# Patient Record
Sex: FEMALE | Race: BLACK OR AFRICAN AMERICAN | Employment: OTHER | URBAN - METROPOLITAN AREA
[De-identification: names, ages, dates, MRNs, and addresses within clinical notes are randomized per-mention and may not be internally consistent; named-entity substitution may affect disease eponyms.]

---

## 2017-02-17 ENCOUNTER — HOSPITAL ENCOUNTER (EMERGENCY)
Age: 77
Discharge: HOME | End: 2017-02-17
Attending: EMERGENCY MEDICINE
Payer: MEDICARE

## 2017-02-17 ENCOUNTER — APPOINTMENT (OUTPATIENT)
Dept: GENERAL RADIOLOGY | Age: 77
End: 2017-02-17
Attending: EMERGENCY MEDICINE
Payer: MEDICARE

## 2017-02-17 VITALS
RESPIRATION RATE: 16 BRPM | SYSTOLIC BLOOD PRESSURE: 161 MMHG | OXYGEN SATURATION: 96 % | WEIGHT: 174 LBS | BODY MASS INDEX: 34.16 KG/M2 | HEART RATE: 71 BPM | TEMPERATURE: 98 F | HEIGHT: 60 IN | DIASTOLIC BLOOD PRESSURE: 75 MMHG

## 2017-02-17 DIAGNOSIS — I47.1 PAROXYSMAL SUPRAVENTRICULAR TACHYCARDIA (HCC): Primary | ICD-10-CM

## 2017-02-17 LAB
ALBUMIN SERPL BCP-MCNC: 3.1 G/DL (ref 3.2–4.6)
ALBUMIN/GLOB SERPL: 0.8 {RATIO} (ref 1.2–3.5)
ALP SERPL-CCNC: 89 U/L (ref 50–136)
ALT SERPL-CCNC: 30 U/L (ref 12–65)
ANION GAP BLD CALC-SCNC: 9 MMOL/L (ref 7–16)
AST SERPL W P-5'-P-CCNC: 47 U/L (ref 15–37)
ATRIAL RATE: 76 BPM
BASOPHILS # BLD AUTO: 0 K/UL (ref 0–0.2)
BASOPHILS # BLD: 0 % (ref 0–2)
BILIRUB SERPL-MCNC: 1.1 MG/DL (ref 0.2–1.1)
BUN SERPL-MCNC: 10 MG/DL (ref 8–23)
CALCIUM SERPL-MCNC: 8.6 MG/DL (ref 8.3–10.4)
CALCULATED P AXIS, ECG09: 63 DEGREES
CALCULATED R AXIS, ECG10: -17 DEGREES
CALCULATED T AXIS, ECG11: 10 DEGREES
CHLORIDE SERPL-SCNC: 104 MMOL/L (ref 98–107)
CO2 SERPL-SCNC: 27 MMOL/L (ref 21–32)
CREAT SERPL-MCNC: 1.1 MG/DL (ref 0.6–1)
DIAGNOSIS, 93000: NORMAL
DIASTOLIC BP, ECG02: NORMAL MMHG
DIFFERENTIAL METHOD BLD: ABNORMAL
EOSINOPHIL # BLD: 0.1 K/UL (ref 0–0.8)
EOSINOPHIL NFR BLD: 3 % (ref 0.5–7.8)
ERYTHROCYTE [DISTWIDTH] IN BLOOD BY AUTOMATED COUNT: 14.1 % (ref 11.9–14.6)
GLOBULIN SER CALC-MCNC: 3.9 G/DL (ref 2.3–3.5)
GLUCOSE SERPL-MCNC: 264 MG/DL (ref 65–100)
HCT VFR BLD AUTO: 38.6 % (ref 35.8–46.3)
HGB BLD-MCNC: 11.8 G/DL (ref 11.7–15.4)
IMM GRANULOCYTES # BLD: 0 K/UL (ref 0–0.5)
IMM GRANULOCYTES NFR BLD AUTO: 0.2 % (ref 0–5)
LYMPHOCYTES # BLD AUTO: 35 % (ref 13–44)
LYMPHOCYTES # BLD: 1.6 K/UL (ref 0.5–4.6)
MAGNESIUM SERPL-MCNC: 1.9 MG/DL (ref 1.8–2.4)
MCH RBC QN AUTO: 23.2 PG (ref 26.1–32.9)
MCHC RBC AUTO-ENTMCNC: 30.6 G/DL (ref 31.4–35)
MCV RBC AUTO: 76 FL (ref 79.6–97.8)
MONOCYTES # BLD: 0.2 K/UL (ref 0.1–1.3)
MONOCYTES NFR BLD AUTO: 5 % (ref 4–12)
NEUTS SEG # BLD: 2.5 K/UL (ref 1.7–8.2)
NEUTS SEG NFR BLD AUTO: 57 % (ref 43–78)
P-R INTERVAL, ECG05: 162 MS
PLATELET # BLD AUTO: 190 K/UL (ref 150–450)
PMV BLD AUTO: 11.1 FL (ref 10.8–14.1)
POTASSIUM SERPL-SCNC: 4.8 MMOL/L (ref 3.5–5.1)
PROT SERPL-MCNC: 7 G/DL (ref 6.3–8.2)
Q-T INTERVAL, ECG07: 422 MS
QRS DURATION, ECG06: 114 MS
QTC CALCULATION (BEZET), ECG08: 474 MS
RBC # BLD AUTO: 5.08 M/UL (ref 4.05–5.25)
SODIUM SERPL-SCNC: 140 MMOL/L (ref 136–145)
SYSTOLIC BP, ECG01: NORMAL MMHG
TROPONIN I SERPL-MCNC: <0.02 NG/ML (ref 0.02–0.05)
VENTRICULAR RATE, ECG03: 76 BPM
WBC # BLD AUTO: 4.5 K/UL (ref 4.3–11.1)

## 2017-02-17 PROCEDURE — 71020 XR CHEST PA LAT: CPT

## 2017-02-17 PROCEDURE — 80053 COMPREHEN METABOLIC PANEL: CPT | Performed by: EMERGENCY MEDICINE

## 2017-02-17 PROCEDURE — 85025 COMPLETE CBC W/AUTO DIFF WBC: CPT

## 2017-02-17 PROCEDURE — 93005 ELECTROCARDIOGRAM TRACING: CPT | Performed by: EMERGENCY MEDICINE

## 2017-02-17 PROCEDURE — 83735 ASSAY OF MAGNESIUM: CPT | Performed by: EMERGENCY MEDICINE

## 2017-02-17 PROCEDURE — 99284 EMERGENCY DEPT VISIT MOD MDM: CPT | Performed by: EMERGENCY MEDICINE

## 2017-02-17 PROCEDURE — 84484 ASSAY OF TROPONIN QUANT: CPT | Performed by: EMERGENCY MEDICINE

## 2017-02-17 NOTE — ED NOTES
I have reviewed discharge instructions with the patient. Patient verbalizes understanding. Opportunity for questions provided. Patient ambulatory off the unit. No distress noted at this time.

## 2017-02-17 NOTE — ED TRIAGE NOTES
Patient complains of palpitations that began around 1015 today. Denies any chest pain. Reports some SOB. States she feels okay now.

## 2017-02-17 NOTE — ED PROVIDER NOTES
HPI Comments: Patient presents to the ER after having an episode today of acute onset of some palpitations and feeling as if her heart was skipping a beat. Patient states it lasted for approximately 10 minutes. She reports some associated lightheadedness but denies any chest pain, nausea or vomiting. She denies any fevers or chills. Patient is a 67 y.o. female presenting with palpitations. The history is provided by the patient. Palpitations    This is a new problem. The current episode started 1 to 2 hours ago. The problem has been resolved. On average, each episode lasts 10 minutes. The problem is associated with nothing. Associated symptoms include irregular heartbeat. Pertinent negatives include no diaphoresis, no fever, no numbness, no chest pain, no exertional chest pressure, no PND, no abdominal pain, no headaches, no back pain, no lower extremity edema, no cough and no shortness of breath. Risk factors include cardiac disease. Her past medical history is significant for heart disease and past MI. Past Medical History:   Diagnosis Date    Arthritis      osteoarthritis - L leg     CAD (coronary artery disease)      stent placement 2008- 2000 MI     Carotid artery stenosis without cerebral infarction 7/7/2016     Asx right carotid stenosis found. Carotid stenting 6/09.  U/S 2015 showing only minor obstruction bilaterally     CHF (congestive heart failure) ([address] 7/7/2016     resolved    CHF (congestive heart failure) ([address] 7/7/2016    Chronic kidney disease, stage III (moderate) 7/7/2016     Per Dr. Navin Montejo Chronic kidney disease, stage III (moderate) 7/7/2016    Diabetes ([address]      non-insulin dependent - fbs 68    Full dentures      upper & lower    GERD (gastroesophageal reflux disease)      controlled with meds     Hypertension      controlled with meds     Palpitations 5/28/2013    Paroxysmal SVT (supraventricular tachycardia) ([address] 7/7/2016     Rapid atrial     Paroxysmal SVT (supraventricular tachycardia) ([facility] 7/7/2016    Pelvic mass 9/14/2009    Ventricular tachycardia [facility])        Past Surgical History:   Procedure Laterality Date    Pr cardiac surg procedure unlist       cardiac stent 2000     Vascular surgery procedure unlist       carotid artery stent placement (side unknown). Family History:   Problem Relation Age of Onset    Diabetes Father     Hypertension Father     Heart Disease Father     Diabetes Brother     Heart Disease Brother        Social History     Social History    Marital status:      Spouse name: N/A    Number of children: N/A    Years of education: N/A     Occupational History    Not on file. Social History Main Topics    Smoking status: Never Smoker    Smokeless tobacco: Not on file    Alcohol use No    Drug use: Not on file    Sexual activity: Not on file     Other Topics Concern    Not on file     Social History Narrative         ALLERGIES: Review of patient's allergies indicates no known allergies. Review of Systems   Constitutional: Negative for diaphoresis and fever. HENT: Negative for congestion, dental problem, trouble swallowing and voice change. Eyes: Negative for redness and visual disturbance. Respiratory: Negative for cough, chest tightness and shortness of breath. Cardiovascular: Positive for palpitations. Negative for chest pain and PND. Gastrointestinal: Negative for abdominal pain. Endocrine: Negative for polyphagia and polyuria. Genitourinary: Negative for flank pain, frequency and urgency. Musculoskeletal: Negative for back pain and gait problem. Skin: Negative for pallor and rash. Allergic/Immunologic: Negative for food allergies and immunocompromised state. Neurological: Negative for syncope, speech difficulty, numbness and headaches. Hematological: Does not bruise/bleed easily. Psychiatric/Behavioral: Negative for behavioral problems and confusion.    All other systems reviewed and are negative. Vitals:    02/17/17 1049   BP: 124/61   Pulse: 83   Resp: 16   Temp: 98 °F (36.7 °C)   SpO2: 95%   Weight: 78.9 kg (174 lb)   Height: 5' (1.524 m)            Physical Exam   Constitutional: She is oriented to person, place, and time. She appears well-developed and well-nourished. No distress. HENT:   Head: Normocephalic and atraumatic. Mouth/Throat: Oropharynx is clear and moist.   Eyes: Conjunctivae and EOM are normal. Pupils are equal, round, and reactive to light. No scleral icterus. Neck: Normal range of motion. Neck supple. No tracheal deviation present. No thyromegaly present. Cardiovascular: Normal rate, regular rhythm, normal heart sounds and intact distal pulses. Exam reveals no gallop and no friction rub. No murmur heard. Pulmonary/Chest: Effort normal and breath sounds normal. No respiratory distress. She has no wheezes. Abdominal: Soft. Bowel sounds are normal. She exhibits no distension. There is no tenderness. Musculoskeletal: Normal range of motion. She exhibits no edema, tenderness or deformity. Neurological: She is alert and oriented to person, place, and time. She has normal reflexes. Skin: Skin is warm and dry. No rash noted. She is not diaphoretic. No erythema. Nursing note and vitals reviewed.        MDM  Number of Diagnoses or Management Options  Diagnosis management comments: Patient has a history of paroxysmal SVT and that likely was the cause of her symptoms    She currently is a normal sinus rhythm, will check basic labs including cardiac enzymes    12:20 PM  Patient maintains a normal sinus rhythm, normal labs  We'll discharge home, she will need follow-up cardiology as needed       Amount and/or Complexity of Data Reviewed  Clinical lab tests: ordered and reviewed    Risk of Complications, Morbidity, and/or Mortality  Presenting problems: moderate  Diagnostic procedures: low  Management options: moderate    Patient Progress  Patient progress: stable    ED Course       Procedures           Results Include:    Recent Results (from the past 24 hour(s))   METABOLIC PANEL, COMPREHENSIVE    Collection Time: 02/17/17 11:04 AM   Result Value Ref Range    Sodium 140 136 - 145 mmol/L    Potassium 4.8 3.5 - 5.1 mmol/L    Chloride 104 98 - 107 mmol/L    CO2 27 21 - 32 mmol/L    Anion gap 9 7 - 16 mmol/L    Glucose 264 (H) 65 - 100 mg/dL    BUN 10 8 - 23 MG/DL    Creatinine 1.10 (H) 0.6 - 1.0 MG/DL    GFR est AA >60 >60 ml/min/1.73m2    GFR est non-AA 51 (L) >60 ml/min/1.73m2    Calcium 8.6 8.3 - 10.4 MG/DL    Bilirubin, total 1.1 0.2 - 1.1 MG/DL    ALT (SGPT) 30 12 - 65 U/L    AST (SGOT) 47 (H) 15 - 37 U/L    Alk. phosphatase 89 50 - 136 U/L    Protein, total 7.0 6.3 - 8.2 g/dL    Albumin 3.1 (L) 3.2 - 4.6 g/dL    Globulin 3.9 (H) 2.3 - 3.5 g/dL    A-G Ratio 0.8 (L) 1.2 - 3.5     TROPONIN I    Collection Time: 02/17/17 11:04 AM   Result Value Ref Range    Troponin-I, Qt. <0.02 (L) 0.02 - 0.05 NG/ML   MAGNESIUM    Collection Time: 02/17/17 11:04 AM   Result Value Ref Range    Magnesium 1.9 1.8 - 2.4 mg/dL   CBC WITH AUTOMATED DIFF    Collection Time: 02/17/17 11:54 AM   Result Value Ref Range    WBC 4.5 4.3 - 11.1 K/uL    RBC 5.08 4.05 - 5.25 M/uL    HGB 11.8 11.7 - 15.4 g/dL    HCT 38.6 35.8 - 46.3 %    MCV 76.0 (L) 79.6 - 97.8 FL    MCH 23.2 (L) 26.1 - 32.9 PG    MCHC 30.6 (L) 31.4 - 35.0 g/dL    RDW 14.1 11.9 - 14.6 %    PLATELET 184 107 - 904 K/uL    MPV 11.1 10.8 - 14.1 FL    DF AUTOMATED      NEUTROPHILS 57 43 - 78 %    LYMPHOCYTES 35 13 - 44 %    MONOCYTES 5 4.0 - 12.0 %    EOSINOPHILS 3 0.5 - 7.8 %    BASOPHILS 0 0.0 - 2.0 %    IMMATURE GRANULOCYTES 0.2 0.0 - 5.0 %    ABS. NEUTROPHILS 2.5 1.7 - 8.2 K/UL    ABS. LYMPHOCYTES 1.6 0.5 - 4.6 K/UL    ABS. MONOCYTES 0.2 0.1 - 1.3 K/UL    ABS. EOSINOPHILS 0.1 0.0 - 0.8 K/UL    ABS. BASOPHILS 0.0 0.0 - 0.2 K/UL    ABS. IMM.  GRANS. 0.0 0.0 - 0.5 K/UL

## 2017-02-17 NOTE — DISCHARGE INSTRUCTIONS
Supraventricular Tachycardia: Care Instructions  Your Care Instructions    Having supraventricular tachycardia (SVT) means that from time to time your heart beats abnormally fast. This fast rhythm is caused by changes in the electrical system of your heart. You may feel a fluttering in your chest (palpitations) and have a fast pulse. When your heart is beating fast, you may feel anxious and lightheaded, be short of breath, and feel discomfort in the chest.  Your doctor may prescribe medicines to help slow down your heartbeat. Your doctor may also suggest you try vagal maneuvers when having an episode of SVT. These are things, like bearing down, that might help slow your heart rate. Bearing down means that you try to breathe out with your stomach muscles but you don't let air out of your nose or mouth. Your doctor can show you how to do vagal maneuvers. He or she may suggest you lie down on your back to do them. In some cases, either cardioversion treatment or a procedure called catheter ablation is done to correct SVT. Your doctor may ask you to wear a small electronic device for 1 or 2 days to monitor your heart. It is called a Holter monitor. Follow-up care is a key part of your treatment and safety. Be sure to make and go to all appointments, and call your doctor if you are having problems. It's also a good idea to know your test results and keep a list of the medicines you take. How can you care for yourself at home? · Take your medicines exactly as prescribed. Call your doctor if you think you are having a problem with your medicine. You will get more details on the specific medicines your doctor prescribes. · If your doctor showed you how to do vagal maneuvers, try them when you have an episode. These maneuvers include bearing down or putting an ice-cold, wet towel on your face. · Monitor your condition by keeping a diary of your SVT episodes. Bring this to your doctor appointments.   ¨ Write down how fast or slow your heart was beating. To count your heart rate:  1. Gently place 2 fingers of your hand on the inside of your other wrist, below your thumb. 2. Count the beats for 30 seconds. 3. Then, double the result to get the number of beats per minute. ¨ Write down if your heart rhythm was regular or irregular. ¨ Write down the symptoms you had. ¨ Write down the time of day your symptoms occurred. ¨ Write down how long your symptoms lasted. ¨ Write down what you were doing when your symptoms started. ¨ Write down what may have helped your symptoms go away. · If they trigger episodes, limit or avoid alcohol or drinks with caffeine. · Do not use over-the-counter decongestants, herbal remedies, diet pills, or \"pep\" pills, which often contain stimulants. · Do not use illegal drugs, such as cocaine, ecstasy, or methamphetamine, which can speed up your heart's rhythm. · Do not smoke. Smoking can make this condition worse. If you need help quitting, talk to your doctor about stop-smoking programs and medicines. These can increase your chances of quitting for good. · Be alert for new or worsening symptoms, such as shortness of breath, pounding of your heart, or unusual tiredness. If new symptoms develop or your symptoms become worse, call your doctor. When should you call for help? Call 911 anytime you think you may need emergency care. For example, call if:  · You passed out (lost consciousness). · You have symptoms of a heart attack. These may include:  ¨ Chest pain or pressure, or a strange feeling in the chest.  ¨ Sweating. ¨ Shortness of breath. ¨ Nausea or vomiting. ¨ Pain, pressure, or a strange feeling in the back, neck, jaw, or upper belly or in one or both shoulders or arms. ¨ Lightheadedness or a sudden weakness. ¨ A fast or irregular heartbeat. After you call 911, the  may tell you to chew 1 adult-strength or 2 to 4 low-dose aspirin. Wait for an ambulance.  Do not try to drive yourself. Call your doctor now or seek immediate medical care if:  · You have fluttering in your chest (palpitations) that does not go away quickly. · You have frequent palpitations. Watch closely for changes in your health, and be sure to contact your doctor if you have any problems. Where can you learn more? Go to http://laurent-otto.info/. Enter G244 in the search box to learn more about \"Supraventricular Tachycardia: Care Instructions. \"  Current as of: April 26, 2016  Content Version: 11.1  © 2728-3769 [employer]. Care instructions adapted under license by [employer] (which disclaims liability or warranty for this information). If you have questions about a medical condition or this instruction, always ask your healthcare professional. [address] any warranty or liability for your use of this information.

## 2017-09-06 ENCOUNTER — HOSPITAL ENCOUNTER (OUTPATIENT)
Dept: CT IMAGING | Age: 77
Discharge: HOME | End: 2017-09-06
Attending: INTERNAL MEDICINE
Payer: MEDICARE

## 2017-09-06 DIAGNOSIS — I65.23 CAROTID ARTERY STENOSIS WITHOUT CEREBRAL INFARCTION, BILATERAL: ICD-10-CM

## 2017-09-06 LAB — CREAT BLD-MCNC: 0.9 MG/DL (ref 0.8–1.5)

## 2017-09-06 PROCEDURE — 70498 CT ANGIOGRAPHY NECK: CPT

## 2017-09-06 PROCEDURE — 74011636320 HC RX REV CODE- 636/320: Performed by: INTERNAL MEDICINE

## 2017-09-06 PROCEDURE — 74011000258 HC RX REV CODE- 258: Performed by: INTERNAL MEDICINE

## 2017-09-06 PROCEDURE — 82565 ASSAY OF CREATININE: CPT

## 2017-09-06 RX ORDER — SODIUM CHLORIDE 0.9 % (FLUSH) 0.9 %
10 SYRINGE (ML) INJECTION
Status: COMPLETED | OUTPATIENT
Start: 2017-09-06 | End: 2017-09-06

## 2017-09-06 RX ADMIN — IOPAMIDOL 125 ML: 755 INJECTION, SOLUTION INTRAVENOUS at 10:40

## 2017-09-06 RX ADMIN — SODIUM CHLORIDE 100 ML: 900 INJECTION, SOLUTION INTRAVENOUS at 10:41

## 2017-09-06 RX ADMIN — Medication 10 ML: at 10:41

## 2017-09-26 ENCOUNTER — HOSPITAL ENCOUNTER (OUTPATIENT)
Dept: ULTRASOUND IMAGING | Age: 77
Discharge: HOME | End: 2017-09-26
Attending: INTERNAL MEDICINE
Payer: MEDICARE

## 2017-09-26 DIAGNOSIS — E04.2 MULTIPLE THYROID NODULES: ICD-10-CM

## 2017-09-26 PROCEDURE — 76536 US EXAM OF HEAD AND NECK: CPT

## 2017-10-06 ENCOUNTER — HOSPITAL ENCOUNTER (OUTPATIENT)
Dept: SURGERY | Age: 77
Discharge: HOME | End: 2017-10-06
Payer: MEDICARE

## 2017-10-06 ENCOUNTER — ANESTHESIA EVENT (OUTPATIENT)
Dept: SURGERY | Age: 77
DRG: 038 | End: 2017-10-06
Payer: MEDICARE

## 2017-10-06 VITALS
WEIGHT: 158 LBS | TEMPERATURE: 98.8 F | OXYGEN SATURATION: 98 % | SYSTOLIC BLOOD PRESSURE: 118 MMHG | HEART RATE: 66 BPM | HEIGHT: 59 IN | BODY MASS INDEX: 31.85 KG/M2 | RESPIRATION RATE: 18 BRPM | DIASTOLIC BLOOD PRESSURE: 64 MMHG

## 2017-10-06 LAB
ANION GAP SERPL CALC-SCNC: 4 MMOL/L (ref 7–16)
BUN SERPL-MCNC: 10 MG/DL (ref 8–23)
CALCIUM SERPL-MCNC: 9.1 MG/DL (ref 8.3–10.4)
CHLORIDE SERPL-SCNC: 108 MMOL/L (ref 98–107)
CO2 SERPL-SCNC: 31 MMOL/L (ref 21–32)
CREAT SERPL-MCNC: 0.88 MG/DL (ref 0.6–1)
ERYTHROCYTE [DISTWIDTH] IN BLOOD BY AUTOMATED COUNT: 14.3 % (ref 11.9–14.6)
GLUCOSE BLD STRIP.AUTO-MCNC: 33 MG/DL (ref 65–100)
GLUCOSE BLD STRIP.AUTO-MCNC: 62 MG/DL (ref 65–100)
GLUCOSE SERPL-MCNC: 32 MG/DL (ref 65–100)
HCT VFR BLD AUTO: 37.4 % (ref 35.8–46.3)
HGB BLD-MCNC: 11.8 G/DL (ref 11.7–15.4)
MCH RBC QN AUTO: 23 PG (ref 26.1–32.9)
MCHC RBC AUTO-ENTMCNC: 31.6 G/DL (ref 31.4–35)
MCV RBC AUTO: 73 FL (ref 79.6–97.8)
PLATELET # BLD AUTO: 177 K/UL (ref 150–450)
PMV BLD AUTO: 9.7 FL (ref 10.8–14.1)
POTASSIUM SERPL-SCNC: 3.9 MMOL/L (ref 3.5–5.1)
RBC # BLD AUTO: 5.12 M/UL (ref 4.05–5.25)
SODIUM SERPL-SCNC: 143 MMOL/L (ref 136–145)
WBC # BLD AUTO: 4 K/UL (ref 4.3–11.1)

## 2017-10-06 PROCEDURE — 82962 GLUCOSE BLOOD TEST: CPT

## 2017-10-06 PROCEDURE — 80048 BASIC METABOLIC PNL TOTAL CA: CPT | Performed by: SURGERY

## 2017-10-06 PROCEDURE — 85027 COMPLETE CBC AUTOMATED: CPT | Performed by: SURGERY

## 2017-10-06 NOTE — PERIOP NOTES
Patient verified name, , and surgery as listed in [facility]. Patient provided medical/health information and PTA medications to the best of their ability. TYPE  CASE:2  Orders per surgeon:  Received  Labs per surgeon:CBC,BMP. Results: pending  Labs per anesthesia protocol: SQBS,T/S DOS. Results SQBS=33. Pt given Orange juice and peanutbutter crackers. SQBS increased to 64. EKG  :  17  Dr Yesica Heck in to see pt, aware of pts blood sugar. Chart reviewed and pt approved for surgery. Patient provided with and instructed on education handouts including Guide to Surgery, blood transfusions, pain management, and hand hygiene for the family and community, and [facility] brochure. Hibiclens and instructions given per hospital policy. Instructed patient to continue previous medications as prescribed prior to surgery unless otherwise directed and to take the following medications the day of surgery according to anesthesia guidelines : Atorvastatin, carvedilol, Omeprazole . Instructed patient to hold  the following medications: Vitamin D, Multivitamin. .    Original medication prescription bottles not visualized during patient appointment. Patient teach back successful and patient demonstrates knowledge of instruction.

## 2017-10-06 NOTE — PERIOP NOTES
Recent Results (from the past 12 hour(s))   CBC W/O DIFF    Collection Time: 10/06/17  9:01 AM   Result Value Ref Range    WBC 4.0 (L) 4.3 - 11.1 K/uL    RBC 5.12 4.05 - 5.25 M/uL    HGB 11.8 11.7 - 15.4 g/dL    HCT 37.4 35.8 - 46.3 %    MCV 73.0 (L) 79.6 - 97.8 FL    MCH 23.0 (L) 26.1 - 32.9 PG    MCHC 31.6 31.4 - 35.0 g/dL    RDW 14.3 11.9 - 14.6 %    PLATELET 113 530 - 251 K/uL    MPV 9.7 (L) 10.8 - 91.0 FL   METABOLIC PANEL, BASIC    Collection Time: 10/06/17  9:01 AM   Result Value Ref Range    Sodium 143 136 - 145 mmol/L    Potassium 3.9 3.5 - 5.1 mmol/L    Chloride 108 (H) 98 - 107 mmol/L    CO2 31 21 - 32 mmol/L    Anion gap 4 (L) 7 - 16 mmol/L    Glucose 32 (LL) 65 - 100 mg/dL    BUN 10 8 - 23 MG/DL    Creatinine 0.88 0.6 - 1.0 MG/DL    GFR est AA >60 >60 ml/min/1.73m2    GFR est non-AA >60 >60 ml/min/1.73m2    Calcium 9.1 8.3 - 10.4 MG/DL   GLUCOSE, POC    Collection Time: 10/06/17  9:15 AM   Result Value Ref Range    Glucose (POC) 33 (LL) 65 - 100 mg/dL   GLUCOSE, POC    Collection Time: 10/06/17  9:37 AM   Result Value Ref Range    Glucose (POC) 62 (L) 65 - 100 mg/dL   Reviewed

## 2017-10-06 NOTE — ANESTHESIA PREPROCEDURE EVALUATION
Anesthetic History   No history of anesthetic complications            Review of Systems / Medical History  Patient summary reviewed, nursing notes reviewed and pertinent labs reviewed    Pulmonary  Within defined limits                 Neuro/Psych   Within defined limits           Cardiovascular    Hypertension      CHF  Dysrhythmias : SVT  CAD    Exercise tolerance: <4 METS  Comments: H/O cardiac stent in 2000    H/O left carotid stent in 2009    Echo from 2015 showed EF 50-55%, mild MR   GI/Hepatic/Renal     GERD    Renal disease: CRI       Endo/Other    Diabetes: well controlled, type 2, using insulin         Other Findings            Physical Exam    Airway  Mallampati: II  TM Distance: 4 - 6 cm  Neck ROM: normal range of motion   Mouth opening: Normal     Cardiovascular    Rhythm: regular           Dental    Dentition: Full upper dentures and Full lower dentures     Pulmonary  Breath sounds clear to auscultation               Abdominal         Other Findings            Anesthetic Plan    ASA: 3  Anesthesia type: general    Monitoring Plan: Arterial line      Induction: Intravenous  Anesthetic plan and risks discussed with: Patient

## 2017-10-11 ENCOUNTER — HOSPITAL ENCOUNTER (INPATIENT)
Age: 77
LOS: 2 days | Discharge: HOME | DRG: 038 | End: 2017-10-13
Attending: SURGERY | Admitting: SURGERY
Payer: MEDICARE

## 2017-10-11 ENCOUNTER — ANESTHESIA (OUTPATIENT)
Dept: SURGERY | Age: 77
DRG: 038 | End: 2017-10-11
Payer: MEDICARE

## 2017-10-11 PROBLEM — I77.9 CAROTID ARTERY DISEASE (HCC): Status: ACTIVE | Noted: 2017-10-11

## 2017-10-11 LAB
ABO + RH BLD: NORMAL
ACT BLD: 136 SECS (ref 70–128)
BLOOD GROUP ANTIBODIES SERPL: NORMAL
GLUCOSE BLD STRIP.AUTO-MCNC: 179 MG/DL (ref 65–100)
GLUCOSE BLD STRIP.AUTO-MCNC: 185 MG/DL (ref 65–100)
GLUCOSE BLD STRIP.AUTO-MCNC: 63 MG/DL (ref 65–100)
GLUCOSE BLD STRIP.AUTO-MCNC: 76 MG/DL (ref 65–100)
GLUCOSE BLD STRIP.AUTO-MCNC: 78 MG/DL (ref 65–100)
POTASSIUM BLD-SCNC: 4 MMOL/L (ref 3.5–5.1)
SPECIMEN EXP DATE BLD: NORMAL

## 2017-10-11 PROCEDURE — 77030011640 HC PAD GRND REM COVD -A: Performed by: SURGERY

## 2017-10-11 PROCEDURE — 77030020782 HC GWN BAIR PAWS FLX 3M -B: Performed by: ANESTHESIOLOGY

## 2017-10-11 PROCEDURE — 03CH0ZZ EXTIRPATION OF MATTER FROM RIGHT COMMON CAROTID ARTERY, OPEN APPROACH: ICD-10-PCS | Performed by: SURGERY

## 2017-10-11 PROCEDURE — 74011000258 HC RX REV CODE- 258: Performed by: SURGERY

## 2017-10-11 PROCEDURE — 74011250636 HC RX REV CODE- 250/636: Performed by: ANESTHESIOLOGY

## 2017-10-11 PROCEDURE — 74011250636 HC RX REV CODE- 250/636: Performed by: SURGERY

## 2017-10-11 PROCEDURE — 77030008477 HC STYL SATN SLP COVD -A: Performed by: ANESTHESIOLOGY

## 2017-10-11 PROCEDURE — 77030012890

## 2017-10-11 PROCEDURE — 03CK0ZZ EXTIRPATION OF MATTER FROM RIGHT INTERNAL CAROTID ARTERY, OPEN APPROACH: ICD-10-PCS | Performed by: SURGERY

## 2017-10-11 PROCEDURE — 74011000250 HC RX REV CODE- 250

## 2017-10-11 PROCEDURE — 77030008542 HC TBNG MON PRSS EDWD -A: Performed by: ANESTHESIOLOGY

## 2017-10-11 PROCEDURE — 74011636637 HC RX REV CODE- 636/637: Performed by: SURGERY

## 2017-10-11 PROCEDURE — C1768 GRAFT, VASCULAR: HCPCS | Performed by: SURGERY

## 2017-10-11 PROCEDURE — 77030005401 HC CATH RAD ARRO -A: Performed by: ANESTHESIOLOGY

## 2017-10-11 PROCEDURE — 77030013794 HC KT TRNSDUC BLD EDWD -B: Performed by: ANESTHESIOLOGY

## 2017-10-11 PROCEDURE — 77030031139 HC SUT VCRL2 J&J -A: Performed by: SURGERY

## 2017-10-11 PROCEDURE — 77030020407 HC IV BLD WRMR ST 3M -A: Performed by: ANESTHESIOLOGY

## 2017-10-11 PROCEDURE — 77030002933 HC SUT MCRYL J&J -A: Performed by: SURGERY

## 2017-10-11 PROCEDURE — 77030002987 HC SUT PROL J&J -B: Performed by: SURGERY

## 2017-10-11 PROCEDURE — 76010000173 HC OR TIME 3 TO 3.5 HR INTENSV-TIER 1: Performed by: SURGERY

## 2017-10-11 PROCEDURE — 77030032490 HC SLV COMPR SCD KNE COVD -B

## 2017-10-11 PROCEDURE — 77030013292 HC BOWL MX PRSM J&J -A: Performed by: ANESTHESIOLOGY

## 2017-10-11 PROCEDURE — 74011250636 HC RX REV CODE- 250/636

## 2017-10-11 PROCEDURE — 74011250637 HC RX REV CODE- 250/637: Performed by: SURGERY

## 2017-10-11 PROCEDURE — 77030027138 HC INCENT SPIROMETER -A

## 2017-10-11 PROCEDURE — 85347 COAGULATION TIME ACTIVATED: CPT

## 2017-10-11 PROCEDURE — 77030010514 HC APPL CLP LIG COVD -B: Performed by: SURGERY

## 2017-10-11 PROCEDURE — 77030010507 HC ADH SKN DERMBND J&J -B: Performed by: SURGERY

## 2017-10-11 PROCEDURE — 77010033678 HC OXYGEN DAILY

## 2017-10-11 PROCEDURE — 65610000006 HC RM INTENSIVE CARE

## 2017-10-11 PROCEDURE — 82962 GLUCOSE BLOOD TEST: CPT

## 2017-10-11 PROCEDURE — 86900 BLOOD TYPING SEROLOGIC ABO: CPT | Performed by: ANESTHESIOLOGY

## 2017-10-11 PROCEDURE — 77030008703 HC TU ET UNCUF COVD -A: Performed by: ANESTHESIOLOGY

## 2017-10-11 PROCEDURE — 03UH0KZ SUPPLEMENT RIGHT COMMON CAROTID ARTERY WITH NONAUTOLOGOUS TISSUE SUBSTITUTE, OPEN APPROACH: ICD-10-PCS | Performed by: SURGERY

## 2017-10-11 PROCEDURE — 77030019908 HC STETH ESOPH SIMS -A: Performed by: ANESTHESIOLOGY

## 2017-10-11 PROCEDURE — 76060000038 HC ANESTHESIA 3.5 TO 4 HR: Performed by: SURGERY

## 2017-10-11 PROCEDURE — 77030018824 HC SHNT CAR ARGY COVD -B: Performed by: SURGERY

## 2017-10-11 PROCEDURE — 03UK0KZ SUPPLEMENT RIGHT INTERNAL CAROTID ARTERY WITH NONAUTOLOGOUS TISSUE SUBSTITUTE, OPEN APPROACH: ICD-10-PCS | Performed by: SURGERY

## 2017-10-11 PROCEDURE — 77030018673: Performed by: SURGERY

## 2017-10-11 PROCEDURE — 03UM0KZ SUPPLEMENT RIGHT EXTERNAL CAROTID ARTERY WITH NONAUTOLOGOUS TISSUE SUBSTITUTE, OPEN APPROACH: ICD-10-PCS | Performed by: SURGERY

## 2017-10-11 PROCEDURE — 77030034888 HC SUT PROL 2 J&J -B: Performed by: SURGERY

## 2017-10-11 PROCEDURE — 77030002996 HC SUT SLK J&J -A: Performed by: SURGERY

## 2017-10-11 PROCEDURE — 77030018836 HC SOL IRR NACL ICUM -A: Performed by: SURGERY

## 2017-10-11 PROCEDURE — 77030002986 HC SUT PROL J&J -A: Performed by: SURGERY

## 2017-10-11 PROCEDURE — 74011000250 HC RX REV CODE- 250: Performed by: SURGERY

## 2017-10-11 PROCEDURE — 84132 ASSAY OF SERUM POTASSIUM: CPT

## 2017-10-11 PROCEDURE — 77030032490 HC SLV COMPR SCD KNE COVD -B: Performed by: SURGERY

## 2017-10-11 PROCEDURE — 03CM0ZZ EXTIRPATION OF MATTER FROM RIGHT EXTERNAL CAROTID ARTERY, OPEN APPROACH: ICD-10-PCS | Performed by: SURGERY

## 2017-10-11 DEVICE — XENOSURE BIOLOGIC PATCH, 0.8CM X 8CM, EIFU
Type: IMPLANTABLE DEVICE | Site: CAROTID | Status: FUNCTIONAL
Brand: XENOSURE BIOLOGIC PATCH

## 2017-10-11 RX ORDER — LABETALOL HYDROCHLORIDE 5 MG/ML
INJECTION, SOLUTION INTRAVENOUS AS NEEDED
Status: DISCONTINUED | OUTPATIENT
Start: 2017-10-11 | End: 2017-10-11 | Stop reason: HOSPADM

## 2017-10-11 RX ORDER — LIDOCAINE HYDROCHLORIDE 20 MG/ML
INJECTION, SOLUTION EPIDURAL; INFILTRATION; INTRACAUDAL; PERINEURAL AS NEEDED
Status: DISCONTINUED | OUTPATIENT
Start: 2017-10-11 | End: 2017-10-11 | Stop reason: HOSPADM

## 2017-10-11 RX ORDER — LOSARTAN POTASSIUM 25 MG/1
25 TABLET ORAL DAILY
Status: DISCONTINUED | OUTPATIENT
Start: 2017-10-12 | End: 2017-10-13 | Stop reason: HOSPADM

## 2017-10-11 RX ORDER — NITROGLYCERIN 20 MG/100ML
INJECTION INTRAVENOUS AS NEEDED
Status: DISCONTINUED | OUTPATIENT
Start: 2017-10-11 | End: 2017-10-11 | Stop reason: HOSPADM

## 2017-10-11 RX ORDER — ONDANSETRON 2 MG/ML
INJECTION INTRAMUSCULAR; INTRAVENOUS AS NEEDED
Status: DISCONTINUED | OUTPATIENT
Start: 2017-10-11 | End: 2017-10-11 | Stop reason: HOSPADM

## 2017-10-11 RX ORDER — NITROGLYCERIN 0.4 MG/1
0.4 TABLET SUBLINGUAL
Status: DISCONTINUED | OUTPATIENT
Start: 2017-10-11 | End: 2017-10-13 | Stop reason: HOSPADM

## 2017-10-11 RX ORDER — SODIUM CHLORIDE, SODIUM LACTATE, POTASSIUM CHLORIDE, CALCIUM CHLORIDE 600; 310; 30; 20 MG/100ML; MG/100ML; MG/100ML; MG/100ML
INJECTION, SOLUTION INTRAVENOUS
Status: DISCONTINUED | OUTPATIENT
Start: 2017-10-11 | End: 2017-10-11 | Stop reason: HOSPADM

## 2017-10-11 RX ORDER — SODIUM CHLORIDE 0.9 % (FLUSH) 0.9 %
5-10 SYRINGE (ML) INJECTION EVERY 8 HOURS
Status: DISCONTINUED | OUTPATIENT
Start: 2017-10-11 | End: 2017-10-13 | Stop reason: HOSPADM

## 2017-10-11 RX ORDER — CEFAZOLIN SODIUM IN 0.9 % NACL 2 G/50 ML
2 INTRAVENOUS SOLUTION, PIGGYBACK (ML) INTRAVENOUS ONCE
Status: COMPLETED | OUTPATIENT
Start: 2017-10-11 | End: 2017-10-11

## 2017-10-11 RX ORDER — HEPARIN SODIUM 5000 [USP'U]/ML
INJECTION, SOLUTION INTRAVENOUS; SUBCUTANEOUS AS NEEDED
Status: DISCONTINUED | OUTPATIENT
Start: 2017-10-11 | End: 2017-10-11 | Stop reason: HOSPADM

## 2017-10-11 RX ORDER — HYDROCODONE BITARTRATE AND ACETAMINOPHEN 5; 325 MG/1; MG/1
1 TABLET ORAL
Status: DISCONTINUED | OUTPATIENT
Start: 2017-10-11 | End: 2017-10-13 | Stop reason: HOSPADM

## 2017-10-11 RX ORDER — CEFAZOLIN SODIUM IN 0.9 % NACL 2 G/50 ML
2 INTRAVENOUS SOLUTION, PIGGYBACK (ML) INTRAVENOUS EVERY 8 HOURS
Status: COMPLETED | OUTPATIENT
Start: 2017-10-11 | End: 2017-10-12

## 2017-10-11 RX ORDER — NICARDIPINE HYDROCHLORIDE 0.1 MG/ML
5-15 INJECTION INTRAVENOUS
Status: DISCONTINUED | OUTPATIENT
Start: 2017-10-11 | End: 2017-10-11 | Stop reason: SDUPTHER

## 2017-10-11 RX ORDER — GLYCOPYRROLATE 0.2 MG/ML
INJECTION INTRAMUSCULAR; INTRAVENOUS AS NEEDED
Status: DISCONTINUED | OUTPATIENT
Start: 2017-10-11 | End: 2017-10-11 | Stop reason: HOSPADM

## 2017-10-11 RX ORDER — DEXAMETHASONE SODIUM PHOSPHATE 4 MG/ML
INJECTION, SOLUTION INTRA-ARTICULAR; INTRALESIONAL; INTRAMUSCULAR; INTRAVENOUS; SOFT TISSUE AS NEEDED
Status: DISCONTINUED | OUTPATIENT
Start: 2017-10-11 | End: 2017-10-11 | Stop reason: HOSPADM

## 2017-10-11 RX ORDER — BUPIVACAINE HYDROCHLORIDE 2.5 MG/ML
INJECTION, SOLUTION EPIDURAL; INFILTRATION; INTRACAUDAL AS NEEDED
Status: DISCONTINUED | OUTPATIENT
Start: 2017-10-11 | End: 2017-10-11 | Stop reason: HOSPADM

## 2017-10-11 RX ORDER — ASPIRIN 81 MG/1
81 TABLET ORAL DAILY
Status: DISCONTINUED | OUTPATIENT
Start: 2017-10-11 | End: 2017-10-13 | Stop reason: HOSPADM

## 2017-10-11 RX ORDER — CARVEDILOL 3.12 MG/1
6.25 TABLET ORAL 2 TIMES DAILY WITH MEALS
Status: DISCONTINUED | OUTPATIENT
Start: 2017-10-11 | End: 2017-10-12

## 2017-10-11 RX ORDER — FENTANYL CITRATE 50 UG/ML
INJECTION, SOLUTION INTRAMUSCULAR; INTRAVENOUS AS NEEDED
Status: DISCONTINUED | OUTPATIENT
Start: 2017-10-11 | End: 2017-10-11 | Stop reason: HOSPADM

## 2017-10-11 RX ORDER — PROTAMINE SULFATE 10 MG/ML
INJECTION, SOLUTION INTRAVENOUS AS NEEDED
Status: DISCONTINUED | OUTPATIENT
Start: 2017-10-11 | End: 2017-10-11 | Stop reason: HOSPADM

## 2017-10-11 RX ORDER — ONDANSETRON 2 MG/ML
4 INJECTION INTRAMUSCULAR; INTRAVENOUS
Status: DISCONTINUED | OUTPATIENT
Start: 2017-10-11 | End: 2017-10-13 | Stop reason: HOSPADM

## 2017-10-11 RX ORDER — SODIUM CHLORIDE, SODIUM LACTATE, POTASSIUM CHLORIDE, CALCIUM CHLORIDE 600; 310; 30; 20 MG/100ML; MG/100ML; MG/100ML; MG/100ML
25 INJECTION, SOLUTION INTRAVENOUS CONTINUOUS
Status: DISCONTINUED | OUTPATIENT
Start: 2017-10-11 | End: 2017-10-13 | Stop reason: HOSPADM

## 2017-10-11 RX ORDER — NEOSTIGMINE METHYLSULFATE 1 MG/ML
INJECTION INTRAVENOUS AS NEEDED
Status: DISCONTINUED | OUTPATIENT
Start: 2017-10-11 | End: 2017-10-11 | Stop reason: HOSPADM

## 2017-10-11 RX ORDER — MORPHINE SULFATE 2 MG/ML
2 INJECTION, SOLUTION INTRAMUSCULAR; INTRAVENOUS
Status: DISCONTINUED | OUTPATIENT
Start: 2017-10-11 | End: 2017-10-13 | Stop reason: HOSPADM

## 2017-10-11 RX ORDER — NITROGLYCERIN 20 MG/100ML
INJECTION INTRAVENOUS
Status: DISCONTINUED | OUTPATIENT
Start: 2017-10-11 | End: 2017-10-11 | Stop reason: HOSPADM

## 2017-10-11 RX ORDER — PROPOFOL 10 MG/ML
INJECTION, EMULSION INTRAVENOUS AS NEEDED
Status: DISCONTINUED | OUTPATIENT
Start: 2017-10-11 | End: 2017-10-11 | Stop reason: HOSPADM

## 2017-10-11 RX ORDER — SODIUM CHLORIDE 9 MG/ML
75 INJECTION, SOLUTION INTRAVENOUS CONTINUOUS
Status: DISCONTINUED | OUTPATIENT
Start: 2017-10-11 | End: 2017-10-13 | Stop reason: HOSPADM

## 2017-10-11 RX ORDER — SODIUM CHLORIDE 0.9 % (FLUSH) 0.9 %
5-10 SYRINGE (ML) INJECTION AS NEEDED
Status: DISCONTINUED | OUTPATIENT
Start: 2017-10-11 | End: 2017-10-13 | Stop reason: HOSPADM

## 2017-10-11 RX ORDER — HEPARIN SODIUM 1000 [USP'U]/ML
INJECTION, SOLUTION INTRAVENOUS; SUBCUTANEOUS AS NEEDED
Status: DISCONTINUED | OUTPATIENT
Start: 2017-10-11 | End: 2017-10-11 | Stop reason: HOSPADM

## 2017-10-11 RX ORDER — LIDOCAINE HYDROCHLORIDE 10 MG/ML
INJECTION INFILTRATION; PERINEURAL AS NEEDED
Status: DISCONTINUED | OUTPATIENT
Start: 2017-10-11 | End: 2017-10-11 | Stop reason: HOSPADM

## 2017-10-11 RX ORDER — ROCURONIUM BROMIDE 10 MG/ML
INJECTION, SOLUTION INTRAVENOUS AS NEEDED
Status: DISCONTINUED | OUTPATIENT
Start: 2017-10-11 | End: 2017-10-11 | Stop reason: HOSPADM

## 2017-10-11 RX ORDER — FUROSEMIDE 40 MG/1
20 TABLET ORAL DAILY
Status: DISCONTINUED | OUTPATIENT
Start: 2017-10-11 | End: 2017-10-13 | Stop reason: HOSPADM

## 2017-10-11 RX ADMIN — LIDOCAINE HYDROCHLORIDE 60 MG: 20 INJECTION, SOLUTION EPIDURAL; INFILTRATION; INTRACAUDAL; PERINEURAL at 07:30

## 2017-10-11 RX ADMIN — CEFAZOLIN 2 G: 1 INJECTION, POWDER, FOR SOLUTION INTRAMUSCULAR; INTRAVENOUS; PARENTERAL at 18:15

## 2017-10-11 RX ADMIN — ASPIRIN 81 MG: 81 TABLET, COATED ORAL at 11:46

## 2017-10-11 RX ADMIN — DEXAMETHASONE SODIUM PHOSPHATE 10 MG: 4 INJECTION, SOLUTION INTRA-ARTICULAR; INTRALESIONAL; INTRAMUSCULAR; INTRAVENOUS; SOFT TISSUE at 08:00

## 2017-10-11 RX ADMIN — HYDROCODONE BITARTRATE AND ACETAMINOPHEN 1 TABLET: 5; 325 TABLET ORAL at 15:50

## 2017-10-11 RX ADMIN — INSULIN HUMAN 2 UNITS: 100 INJECTION, SOLUTION PARENTERAL at 16:26

## 2017-10-11 RX ADMIN — MORPHINE SULFATE 2 MG: 2 INJECTION, SOLUTION INTRAMUSCULAR; INTRAVENOUS at 13:26

## 2017-10-11 RX ADMIN — FENTANYL CITRATE 50 MCG: 50 INJECTION, SOLUTION INTRAMUSCULAR; INTRAVENOUS at 09:22

## 2017-10-11 RX ADMIN — Medication 10 ML: at 21:32

## 2017-10-11 RX ADMIN — CEFAZOLIN 2 G: 1 INJECTION, POWDER, FOR SOLUTION INTRAMUSCULAR; INTRAVENOUS; PARENTERAL at 07:06

## 2017-10-11 RX ADMIN — ONDANSETRON 4 MG: 2 INJECTION INTRAMUSCULAR; INTRAVENOUS at 13:26

## 2017-10-11 RX ADMIN — SODIUM CHLORIDE 4 MG/HR: 900 INJECTION, SOLUTION INTRAVENOUS at 10:58

## 2017-10-11 RX ADMIN — Medication 10 ML: at 11:05

## 2017-10-11 RX ADMIN — PROPOFOL 170 MG: 10 INJECTION, EMULSION INTRAVENOUS at 07:30

## 2017-10-11 RX ADMIN — PROTAMINE SULFATE 50 MG: 10 INJECTION, SOLUTION INTRAVENOUS at 09:55

## 2017-10-11 RX ADMIN — NITROGLYCERIN 100 MCG: 20 INJECTION INTRAVENOUS at 08:56

## 2017-10-11 RX ADMIN — NITROGLYCERIN 100 MCG: 20 INJECTION INTRAVENOUS at 08:57

## 2017-10-11 RX ADMIN — ROCURONIUM BROMIDE 40 MG: 10 INJECTION, SOLUTION INTRAVENOUS at 07:30

## 2017-10-11 RX ADMIN — NITROGLYCERIN 100 MCG: 20 INJECTION INTRAVENOUS at 08:55

## 2017-10-11 RX ADMIN — NEOSTIGMINE METHYLSULFATE 1 MG: 1 INJECTION INTRAVENOUS at 10:25

## 2017-10-11 RX ADMIN — HEPARIN SODIUM 3500 UNITS: 1000 INJECTION, SOLUTION INTRAVENOUS; SUBCUTANEOUS at 09:08

## 2017-10-11 RX ADMIN — SODIUM CHLORIDE, SODIUM LACTATE, POTASSIUM CHLORIDE, AND CALCIUM CHLORIDE: 600; 310; 30; 20 INJECTION, SOLUTION INTRAVENOUS at 08:59

## 2017-10-11 RX ADMIN — NITROGLYCERIN 50 MCG/MIN: 20 INJECTION INTRAVENOUS at 08:02

## 2017-10-11 RX ADMIN — FUROSEMIDE 20 MG: 40 TABLET ORAL at 11:46

## 2017-10-11 RX ADMIN — ONDANSETRON 4 MG: 2 INJECTION INTRAMUSCULAR; INTRAVENOUS at 10:06

## 2017-10-11 RX ADMIN — HEPARIN SODIUM 7000 UNITS: 1000 INJECTION, SOLUTION INTRAVENOUS; SUBCUTANEOUS at 08:23

## 2017-10-11 RX ADMIN — GLYCOPYRROLATE 0.2 MG: 0.2 INJECTION INTRAMUSCULAR; INTRAVENOUS at 10:25

## 2017-10-11 RX ADMIN — LABETALOL HYDROCHLORIDE 10 MG: 5 INJECTION, SOLUTION INTRAVENOUS at 10:15

## 2017-10-11 RX ADMIN — MORPHINE SULFATE 2 MG: 2 INJECTION, SOLUTION INTRAMUSCULAR; INTRAVENOUS at 16:23

## 2017-10-11 RX ADMIN — SODIUM CHLORIDE 75 ML/HR: 900 INJECTION, SOLUTION INTRAVENOUS at 10:57

## 2017-10-11 RX ADMIN — GLYCOPYRROLATE 0.2 MG: 0.2 INJECTION INTRAMUSCULAR; INTRAVENOUS at 10:08

## 2017-10-11 RX ADMIN — NITROGLYCERIN 100 MCG: 20 INJECTION INTRAVENOUS at 09:51

## 2017-10-11 RX ADMIN — CARVEDILOL 6.25 MG: 3.12 TABLET, FILM COATED ORAL at 16:19

## 2017-10-11 RX ADMIN — INSULIN HUMAN 2 UNITS: 100 INJECTION, SOLUTION PARENTERAL at 21:28

## 2017-10-11 RX ADMIN — GLYCOPYRROLATE 0.2 MG: 0.2 INJECTION INTRAMUSCULAR; INTRAVENOUS at 08:00

## 2017-10-11 RX ADMIN — FENTANYL CITRATE 50 MCG: 50 INJECTION, SOLUTION INTRAMUSCULAR; INTRAVENOUS at 08:58

## 2017-10-11 RX ADMIN — NITROGLYCERIN 50 MCG: 20 INJECTION INTRAVENOUS at 09:19

## 2017-10-11 RX ADMIN — LABETALOL HYDROCHLORIDE 10 MG: 5 INJECTION, SOLUTION INTRAVENOUS at 10:32

## 2017-10-11 RX ADMIN — FENTANYL CITRATE 100 MCG: 50 INJECTION, SOLUTION INTRAMUSCULAR; INTRAVENOUS at 07:10

## 2017-10-11 RX ADMIN — LABETALOL HYDROCHLORIDE 5 MG: 5 INJECTION, SOLUTION INTRAVENOUS at 09:52

## 2017-10-11 RX ADMIN — NEOSTIGMINE METHYLSULFATE 2 MG: 1 INJECTION INTRAVENOUS at 10:08

## 2017-10-11 RX ADMIN — SODIUM CHLORIDE, SODIUM LACTATE, POTASSIUM CHLORIDE, AND CALCIUM CHLORIDE 25 ML/HR: 600; 310; 30; 20 INJECTION, SOLUTION INTRAVENOUS at 06:09

## 2017-10-11 RX ADMIN — ROCURONIUM BROMIDE 10 MG: 10 INJECTION, SOLUTION INTRAVENOUS at 08:55

## 2017-10-11 RX ADMIN — SODIUM CHLORIDE, SODIUM LACTATE, POTASSIUM CHLORIDE, CALCIUM CHLORIDE: 600; 310; 30; 20 INJECTION, SOLUTION INTRAVENOUS at 07:45

## 2017-10-11 NOTE — PERIOP NOTES
TRANSFER - OUT REPORT:    Verbal report given to CVICU RN on Meena Oliva  being transferred to CVICU for routine progression of care       Report consisted of patients Situation, Background, Assessment and   Recommendations(SBAR). Information from the following report(s) OR Summary was reviewed with the receiving nurse. Lines:   Peripheral IV 10/11/17 Left Forearm (Active)   Site Assessment Clean, dry, & intact 10/11/2017  5:58 AM   Phlebitis Assessment 0 10/11/2017  5:58 AM   Infiltration Assessment 0 10/11/2017  5:58 AM   Dressing Status Clean, dry, & intact 10/11/2017  5:58 AM   Dressing Type Transparent;Tape 10/11/2017  5:58 AM   Hub Color/Line Status Infusing;Green 10/11/2017  5:58 AM       Peripheral IV 10/11/17 Right Antecubital (Active)       Arterial Line 10/11/17 Right Radial artery (Active)        Opportunity for questions and clarification was provided.       Patient transported with:   Monitor  O2 @ 4 liters  Registered Nurse

## 2017-10-11 NOTE — OP NOTES
[address]   OPERATIVE REPORT       Name:  Logan Tovar   MR#:  [id number]   :  1940   Account #:  [de-identified]   Date of Adm:  10/11/2017       DATE OF OPERATION: 10/11/2017    CLINICAL SERVICE: Vascular Surgery. PREOPERATIVE DIAGNOSIS: High-grade right carotid stenosis,   greater than 90%. POSTOPERATIVE DIAGNOSIS: High-grade right carotid stenosis,   greater than 90%. NAME OF PROCEDURE: Right carotid endarterectomy with bovine   pericardial patch closure. SURGEON: Sam Allen MD    ASSISTANT: Aries Drake MD    COMPLICATIONS: None. INDICATION FOR PROCEDURE: This is a 27-year-old female with   history of carotid disease, status post left carotid stent back   in  at [facility], presented with an asymptomatic   90 degree stenosis on the right. This was confirmed with CTA and   ultrasound. We discussed the risks and benefits of the procedure   including bleeding, infection, nerve, and stroke. We decided to   electively proceed. PROCEDURE IN DETAIL: After giving informed consent, the patient   was brought to the operating room. General anesthesia was then   induced. Preop antibiotics were given for skin incision. The   patient's right neck was then prepped and draped in normal   sterile fashion. Incision was then made over the anterior border   of the sternocleidomastoid. We dissected down through the   platysma with electrocautery. Self-retaining retractor was then   placed, exposing the carotid sheath. We dissected down distally   into the common carotid artery, which was controlled with Rumel   tourniquets. The superior thyroid was controlled with atraumatic   vessel loops. The external was controlled with a Rumel   tourniquet. The internal was controlled about 4 cm distal to the   bifurcation with a Rumel tourniquet. Hypoglossal was identified   and was kept out of harm's way, so was the vagus nerve.  The   patient was systemically heparinized with 100 units per   kilogram. We let it circulate for 2 minutes. We then used an 11   blade to do an arteriotomy on the anterior medial aspect of the   artery. We extended with Nash scissors through the plaque. I   was able to put a #8 shunt in proximally and distally. We checked   it with Doppler signals which were patent. We created   endarterectomy plane with a Donahue elevator, right, just 2 cm   from the common carotid artery. We started our endarterectomy   with a Donahue elevator. We used an eversion technique to pull the   heavily calcified plaque through the external. The plaque was   then removed, leaving a smooth endpoint. All of the remaining fine   debris was then removed with fine forceps. We then brought onto   the field an 8 x 8 bovine pericardial patch. We did a   patch angioplasty with a 6-0 Prolene proximally. We ran stitches   on both sides. We cut the patch to the appropriate size and then   ran a 6-0 Prolene on both sides. Prior to tying down the last   suture, we removed the shunt. We back-bled and fore-flushed the   internal, external and common carotid artery. We then tied the   sutures down, restored flow, first into the external carotid, then   released the common and internal. The patient had a good Doppler   signal in the internal and external carotid artery. We then   reversed the patient with 50 mg of protamine. We held pressure   for 5 minutes. We then reapproximated the sternocleidomastoid   with 2-0 Vicryl, 3-0 Vicryl for the platysma, 4-0 for the skin. The patient was then extubated and returned to the PACU in   stable condition.         MD DINORA Davila   D:  10/11/2017   10:31   T:  10/11/2017   11:01   Job #:  [id number]

## 2017-10-11 NOTE — ANESTHESIA PROCEDURE NOTES
Arterial Line Placement    Start time: 10/11/2017 7:25 AM  End time: 10/11/2017 7:30 AM  Performed by: nAna Galvan  Authorized by: Layo MEANS     Pre-Procedure  Indications:  Arterial pressure monitoring and blood sampling  Preanesthetic Checklist: patient identified, risks and benefits discussed, anesthesia consent, site marked, patient being monitored, timeout performed and patient being monitored    Timeout Time: 07:25        Procedure:   Prep:  ChloraPrep  Seldinger Technique?: No    Orientation:  Right  Location:  Radial artery  Catheter size:  20 G  Number of attempts:  1  Cont Cardiac Output Sensor: No      Assessment:   Post-procedure:  Line secured and sterile dressing applied  Patient Tolerance:  Patient tolerated the procedure well with no immediate complications

## 2017-10-11 NOTE — BRIEF OP NOTE
[address] [place] [address]. [address]  [phone] FAX: [phone]    Brief Op Note Template Note    Pre-Op Diagnosis: Carotid stenosis, right [I65.21]    Post-Op Diagnosis:  Carotid stenosis, right [I65.21]    Procedures: Procedure(s):  RIGHT CAROTID ARTERY ENDARTERECTOMY       Surgeon: Kalyn Aldrich MD    Assistants: Surgeon(s):  MD Juanita Ken MD      Anesthesia:  General     Findings: Heavily diseased right proximal ICA    Tourniquet Time:  * No tourniquets in log *    Estimated Blood Loss:               Specimens: Right ICA plaque           Implants:    Implant Name Type Inv. Item Serial No.  Lot No. LRB No. Used Action   PATCH VASC PERIPATCH 0.8X8CM -- Windell Salm   PATCH VASC [address] 0.8X8CM -- Malia Cohen [address] [place] [address]   Right 1 Implanted       Complications: None               Signed: Kalyn Aldrich MD      Elements of this note have been dictated using speech recognition software. As a result, errors of speech recognition may have occurred.

## 2017-10-11 NOTE — IP AVS SNAPSHOT
Jesus Sellers 
 
 
 [address] 
[phone] Patient: Jeannie Kilpatrick MRN: [id number] :[id number] You are allergic to the following No active allergies Recent Documentation Height Weight Breastfeeding? BMI OB Status Smoking Status 1.499 m 79.5 kg No 35.4 kg/m2 Postmenopausal Never Smoker Emergency Contacts Name Discharge Info Relation Home Work Mobile Wero Servin  Spouse [3] [phone] Glen Jaramillo  Daughter [21] [phone] About your hospitalization You were admitted on:  2017 You last received care in the:  [facility] 1 CV INTENSIVE CARE You were discharged on:  2017 Unit phone number:  [phone] Why you were hospitalized Your primary diagnosis was:  Carotid Artery Disease (Hcc) Your diagnoses also included:  Cad (Coronary Artery Disease), Chf (Congestive Heart Failure) (Hcc), Chronic Kidney Disease, Stage Iii (Moderate), Dyslipidemia, Gerd (Gastroesophageal Reflux Disease), Htn (Hypertension), Malignant Providers Seen During Your Hospitalizations Provider Role Specialty Primary office phone Kalyn Aldrich MD Attending Provider Vascular Surgery [phone] Your Primary Care Physician (PCP) Primary Care Physician Office Phone Office Fax Lolis Boone [phone] Follow-up Information Follow up With Details Comments Contact Info Steven Pandey MD   [address] [facility] [address] [phone] Kalyn Aldrich MD Go on 10/27/2017 at 9:45 AM for right CEA follow-up pedro pablo  [address] [facility] [address] [phone] Your Appointments 2017  9:45 AM EDT Global Post Op with Kalyn Aldrich MD  
[facility] ([facility] [address] \[facility] [place] [facility]\"" [address] [place] [address] [phone] Current Discharge Medication List  
  
START taking these medications Dose & Instructions Dispensing Information Comments Morning Noon Evening Bedtime HYDROcodone-acetaminophen 5-325 mg per tablet Commonly known as:  Martha Fothergill Your next dose is:  As needed Dose:  1 Tab Take 1 Tab by mouth every four (4) hours as needed. Max Daily Amount: 6 Tabs. Quantity:  25 Tab Refills:  0 CONTINUE these medications which have NOT CHANGED Dose & Instructions Dispensing Information Comments Morning Noon Evening Bedtime  
 aspirin delayed-release 81 mg tablet Your next dose is:  Tomorrow Take  by mouth daily. Refills:  0  
     
   
   
   
  
 atorvastatin 40 mg tablet Commonly known as:  LIPITOR Your next dose is: Today Take  by mouth daily. Refills:  0  
     
   
   
   
  
 calcium-vitamin D 500-125 mg-unit tablet Commonly known as:  OSCAL Your next dose is: Tonight Dose:  1 Tab Take 1 Tab by mouth two (2) times a day. Refills:  0  
     
   
   
   
  
 carvedilol 6.25 mg tablet Commonly known as:  Arben Liter Your next dose is: Tonight Dose:  6.25 mg Take 1 Tab by mouth two (2) times daily (with meals). Quantity:  60 Tab Refills:  11  
     
   
   
   
  
 furosemide 20 mg tablet Commonly known as:  LASIX Your next dose is:  Tomorrow Dose:  20 mg Take 1 Tab by mouth daily. Quantity:  30 Tab Refills:  11  
     
   
   
   
  
 insulin detemir 100 unit/mL injection Commonly known as:  LEVEMIR Your next dose is: Tonight Dose:  50 Units 50 Units by SubCUTAneous route nightly. Refills:  0  
     
   
   
   
  
 losartan 25 mg tablet Commonly known as:  COZAAR Your next dose is:  Tomorrow Take  by mouth daily. Refills:  0  
     
   
   
   
  
 multivitamin tablet Commonly known as:  ONE A DAY Your next dose is: Today Dose:  1 Tab Take 1 Tab by mouth daily. Refills:  0  
     
   
   
   
  
 nitroglycerin 0.4 mg SL tablet Commonly known as:  NITROSTAT Your next dose is:  As needed Dose:  0.4 mg  
1 Tab by SubLINGual route every five (5) minutes as needed for Chest Pain. Quantity:  25 Tab Refills:  5 NovoLOG Flexpen 100 unit/mL Inpn Generic drug:  insulin aspart Your next dose is:  Tomorrow Dose:  10 Units 10 Units by SubCUTAneous route daily. Refills:  0  
     
   
   
   
  
 omeprazole 20 mg capsule Commonly known as:  PRILOSEC Your next dose is: Today Dose:  20 mg Take 20 mg by mouth daily. Refills:  0 STOOL SOFTENER 100 mg capsule Generic drug:  docusate sodium Your next dose is: Today Dose:  100 mg Take 100 mg by mouth two (2) times a day. Refills:  0 Where to Get Your Medications Information on where to get these meds will be given to you by the nurse or doctor. ! Ask your nurse or doctor about these medications HYDROcodone-acetaminophen 5-325 mg per tablet Discharge Instructions DISCHARGE SUMMARY from Nurse The following personal items are in your possession at time of discharge: 
 
Dental Appliances: None Visual Aid: None Hearing Aids/Status: Does not own Home Medications: None Jewelry: None Clothing: None Other Valuables: None Personal Items Sent to Safe: none PATIENT INSTRUCTIONS: 
 
 
F-face looks uneven A-arms unable to move or move unevenly S-speech slurred or non-existent T-time-call 911 as soon as signs and symptoms begin-DO NOT go Back to bed or wait to see if you get better-TIME IS BRAIN. Warning Signs of HEART ATTACK Call 911 if you have these symptoms: ? Chest discomfort. Most heart attacks involve discomfort in the center of the chest that lasts more than a few minutes, or that goes away and comes back. It can feel like uncomfortable pressure, squeezing, fullness, or pain. ? Discomfort in other areas of the upper body. Symptoms can include pain or discomfort in one or both arms, the back, neck, jaw, or stomach. ? Shortness of breath with or without chest discomfort. ? Other signs may include breaking out in a cold sweat, nausea, or lightheadedness. Don't wait more than five minutes to call [address]! Fast action can save your life. Calling 911 is almost always the fastest way to get lifesaving treatment. Emergency Medical Services staff can begin treatment when they arrive  up to an hour sooner than if someone gets to the hospital by car. The discharge information has been reviewed with the patient. The patient verbalized understanding. Discharge medications reviewed with the patient and appropriate educational materials and side effects teaching were provided. Discharge Instructions Attachments/References CAROTID ENDARTERECTOMY: POST-OP (ENGLISH) HEALTHY DIET: HEART (ENGLISH) HTN (HYPERTENSION): DIURETICS: GENERAL INFO (ENGLISH) ASPIRIN: HEART ATTACK AND STROKE PREVENTION (ENGLISH) HYDROCODONE/ACETAMINOPHEN (BY MOUTH) (ENGLISH) Discharge Orders None [employer] Announcement We are excited to announce that we are making your provider's discharge notes available to you in [employer]. You will see these notes when they are completed and signed by the physician that discharged you from your recent hospital stay. If you have any questions or concerns about any information you see in [employer], please call the Health Information Department where you were seen or reach out to your Primary Care Provider for more information about your plan of care. Introducing [facility] SERVICES! [facility] introduces NetDocuments patient portal. Now you can access parts of your medical record, email your doctor's office, and request medication refills online. 1. In your internet browser, go to [email or web address]://[employer]. [employer] 2. Click on the First Time User? Click Here link in the Sign In box. You will see the New Member Sign Up page. 3. Enter your NetDocuments Access Code exactly as it appears below. You will not need to use this code after youve completed the sign-up process. If you do not sign up before the expiration date, you must request a new code. · NetDocuments Access Code: [email or web address] Expires: 10/22/2017  3:29 PM 
 
4. Enter the last four digits of your Social Security Number (xxxx) and Date of Birth (mm/dd/yyyy) as indicated and click Submit. You will be taken to the next sign-up page. 5. Create a NetDocuments ID. This will be your NetDocuments login ID and cannot be changed, so think of one that is secure and easy to remember. 6. Create a NetDocuments password. You can change your password at any time. 7. Enter your Password Reset Question and Answer. This can be used at a later time if you forget your password. 8. Enter your e-mail address. You will receive e-mail notification when new information is available in [address] 9. Click Sign Up. You can now view and download portions of your medical record. 10. Click the Download Summary menu link to download a portable copy of your medical information. If you have questions, please visit the Frequently Asked Questions section of the NetDocuments website. Remember, NetDocuments is NOT to be used for urgent needs. For medical emergencies, dial 911. Now available from your iPhone and Android! General Information Please provide this summary of care documentation to your next provider. Patient Signature:  ____________________________________________________________ Date:  ____________________________________________________________  
  
Jennifer Aguillons Provider Signature:  ____________________________________________________________ Date:  ____________________________________________________________ More Information Carotid Endarterectomy: What to Expect at [facility] Your Recovery A carotid endarterectomy (say \"kuh-RAW-tid bd-yyq-wyt-REK-tuh-oziel\") is surgery to remove fatty build-up (plaque) from one of the carotid arteries. There are two carotid arteriesone on each side of the neckthat supply blood to the brain. When plaque builds up in either artery, it can make it hard for blood to flow to the brain. This surgery may lower your risk of having a stroke. You may have a sore throat for a few days. You can expect the cut (incision) in your neck to be sore for about a week. The area around the incision may also be swollen and bruised at first. The area in front of the incision may be numb. This usually gets better after 6 to 12 months. Your doctor closed the incision in your neck with stitches. The stitches will be removed 7 to 10 days after surgery, or you may have stitches that dissolve on their own. You may feel more tired than usual for several weeks after surgery. You will probably be able to go back to work or your usual activities in 1 to 2 weeks. This care sheet gives you a general idea about how long it will take for you to recover. But each person recovers at a different pace. Follow the steps below to feel better as quickly as possible. How can you care for yourself at home? Activity · Rest when you feel tired. Getting enough sleep will help you recover. · Try to walk each day. Start by walking a little more than you did the day before. Bit by bit, increase the amount you walk. Walking boosts blood flow and helps prevent pneumonia and constipation.  
· Avoid strenuous activities, such as bicycle riding, jogging, weight lifting, or aerobic exercise. Your doctor will tell you when it's okay to do strenuous activity. · For 1 to 2 weeks, avoid lifting anything that would make you strain. This may include a child, heavy grocery bags and milk containers, a heavy briefcase or backpack, cat litter or dog food bags, or a vacuum . · Ask your doctor when you can drive again. · You will probably need to take 1 to 2 weeks off from work. It depends on the type of work you do and how you feel. · You may shower and take baths as usual. But do not soak the incision for the first 2 weeks, or until your doctor tells you it is okay. Pat the incision dry. · Your doctor will tell you when you can have sex again. Diet · You can eat your normal diet. If your stomach is upset, try bland, low-fat foods like plain rice, broiled chicken, toast, and yogurt. · Drink plenty of fluids (unless your doctor tells you not to). · You may notice that your bowel movements are not regular right after your surgery. This is common. Try to avoid constipation and straining with bowel movements. You may want to take a fiber supplement every day. If you have not had a bowel movement after a couple of days, ask your doctor about taking a mild laxative. Medicines · Your doctor will tell you if and when you can restart your medicines. He or she will also give you instructions about taking any new medicines. · If you take blood thinners, such as warfarin (Coumadin), clopidogrel (Plavix), or aspirin, be sure to talk to your doctor. He or she will tell you if and when to start taking those medicines again. Make sure that you understand exactly what your doctor wants you to do. · Your doctor may advise you to take aspirin when you go home. This helps prevent blood clots. Take your medicines exactly as prescribed. Call your doctor if you think you are having a problem with your medicine. · Be safe with medicines. Take pain medicines exactly as directed. ¨ If the doctor gave you a prescription medicine for pain, take it as prescribed. ¨ If you are not taking a prescription pain medicine, ask your doctor if you can take an over-the-counter medicine. ¨ Do not take two or more pain medicines at the same time unless the doctor told you to. Many pain medicines have acetaminophen, which is Tylenol. Too much acetaminophen (Tylenol) can be harmful. · If you think your pain medicine is making you sick to your stomach: 
¨ Take your medicine after meals (unless your doctor has told you not to). ¨ Ask your doctor for a different pain medicine. · If your doctor prescribed antibiotics, take them as directed. Do not stop taking them just because you feel better. You need to take the full course of antibiotics. · If you take a blood thinner, such as aspirin, be sure you get instructions about how to take your medicine safely. Blood thinners can cause serious bleeding problems. Incision care · If you have strips of tape over your incision, leave the tape on for a week or until it falls off. · Wash the area daily with water and pat it dry. Other cleaning products, such as hydrogen peroxide, can make the wound heal more slowly. You may cover the area with a gauze bandage if it weeps or rubs against clothing. Change the bandage every day. · Keep the area clean and dry. Follow-up care is a key part of your treatment and safety. Be sure to make and go to all appointments, and call your doctor if you are having problems. It's also a good idea to know your test results and keep a list of the medicines you take. When should you call for help? Call 911 anytime you think you may need emergency care. For example, call if: 
· You passed out (lost consciousness). · You have severe trouble breathing. · You have a tight bulge in your neck on the side where the surgery was done. · You have symptoms of a stroke. These may include: ¨ Sudden numbness, tingling, weakness, or loss of movement in your face, arm, or leg, especially on only one side of your body. ¨ Sudden vision changes. ¨ Sudden trouble speaking. ¨ Sudden confusion or trouble understanding simple statements. ¨ Sudden problems with walking or balance. ¨ A sudden, severe headache that is different from past headaches. · You have chest pain or pressure. This may occur with: ¨ Sweating. ¨ Shortness of breath. ¨ Nausea or vomiting. ¨ Pain that spreads from the chest to the neck, jaw, or one or both shoulders or arms. ¨ Dizziness or lightheadedness. ¨ A fast or uneven pulse. After calling 911, chew 1 adult-strength or 2 to 4 low-dose aspirin. Wait for an ambulance. Do not try to drive yourself. Call your doctor now or seek immediate medical care if: 
· You are sick to your stomach or cannot keep fluids down. · You have pain that does not get better after you take pain medicine. · You have a fever over 100°F. 
· You have loose stitches, or your incision comes open. · Bright red blood has soaked through the bandage over your incision. · You have signs of infection, such as: 
¨ Increased pain, swelling, warmth, or redness. ¨ Red streaks leading from the incisions. ¨ Pus draining from the incisions. ¨ Swollen lymph nodes in your neck, armpits, or groin. ¨ A fever. Watch closely for any changes in your health, and be sure to contact your doctor if you have any problems. Where can you learn more? Go to http://laurent-otto.info/. Enter K888 in the search box to learn more about \"Carotid Endarterectomy: What to Expect at Home. \" Current as of: April 3, 2017 Content Version: 11.3 © 6034-8973 [employer]. Care instructions adapted under license by [employer] (which disclaims liability or warranty for this information).  If you have questions about a medical condition or this instruction, always ask your healthcare professional. [address] any warranty or liability for your use of this information. Heart-Healthy Diet: Care Instructions Your Care Instructions A heart-healthy diet has lots of vegetables, fruits, nuts, beans, and whole grains, and is low in salt. It limits foods that are high in saturated fat, such as meats, cheeses, and fried foods. It may be hard to change your diet, but even small changes can lower your risk of heart attack and heart disease. Follow-up care is a key part of your treatment and safety. Be sure to make and go to all appointments, and call your doctor if you are having problems. It's also a good idea to know your test results and keep a list of the medicines you take. How can you care for yourself at home? Watch your portions · Learn what a serving is. A \"serving\" and a \"portion\" are not always the same thing. Make sure that you are not eating larger portions than are recommended. For example, a serving of pasta is ½ cup. A serving size of meat is 2 to 3 ounces. A 3-ounce serving is about the size of a deck of cards. Measure serving sizes until you are good at [place]" them. Keep in mind that restaurants often serve portions that are 2 or 3 times the size of one serving. · To keep your energy level up and keep you from feeling hungry, eat often but in smaller portions. · Eat only the number of calories you need to stay at a healthy weight. If you need to lose weight, eat fewer calories than your body burns (through exercise and other physical activity). Eat more fruits and vegetables · Eat a variety of fruit and vegetables every day. Dark green, deep orange, red, or yellow fruits and vegetables are especially good for you. Examples include spinach, carrots, peaches, and berries. · Keep carrots, celery, and other veggies handy for snacks.  Buy fruit that is in season and store it where you can see it so that you will be tempted to eat it. · Cook dishes that have a lot of veggies in them, such as stir-fries and soups. Limit saturated and trans fat · Read food labels, and try to avoid saturated and trans fats. They increase your risk of heart disease. Trans fat is found in many processed foods such as cookies and crackers. · Use olive or canola oil when you cook. Try cholesterol-lowering spreads, such as Benecol or Take Control. · Bake, broil, grill, or steam foods instead of frying them. · Choose lean meats instead of high-fat meats such as hot dogs and sausages. Cut off all visible fat when you prepare meat. · Eat fish, skinless poultry, and meat alternatives such as soy products instead of high-fat meats. Soy products, such as tofu, may be especially good for your heart. · Choose low-fat or fat-free milk and dairy products. Eat fish · Eat at least two servings of fish a week. Certain fish, such as salmon and tuna, contain omega-3 fatty acids, which may help reduce your risk of heart attack. Eat foods high in fiber · Eat a variety of grain products every day. Include whole-grain foods that have lots of fiber and nutrients. Examples of whole-grain foods include oats, whole wheat bread, and brown rice. · Buy whole-grain breads and cereals, instead of white bread or pastries. Limit salt and sodium · Limit how much salt and sodium you eat to help lower your blood pressure. · Taste food before you salt it. Add only a little salt when you think you need it. With time, your taste buds will adjust to less salt. · Eat fewer snack items, fast foods, and other high-salt, processed foods. Check food labels for the amount of sodium in packaged foods. · Choose low-sodium versions of canned goods (such as soups, vegetables, and beans). Limit sugar · Limit drinks and foods with added sugar. These include candy, desserts, and soda pop. Limit alcohol · Limit alcohol to no more than 2 drinks a day for men and 1 drink a day for women. Too much alcohol can cause health problems. When should you call for help? Watch closely for changes in your health, and be sure to contact your doctor if: 
· You would like help planning heart-healthy meals. Where can you learn more? Go to http://laurent-otto.info/. Enter V137 in the search box to learn more about \"Heart-Healthy Diet: Care Instructions. \" Current as of: April 3, 2017 Content Version: 11.3 © 5654-2306 [employer]. Care instructions adapted under license by [employer] (which disclaims liability or warranty for this information). If you have questions about a medical condition or this instruction, always ask your healthcare professional. [place] [address] any warranty or liability for your use of this information. Learning About Diuretics for High Blood Pressure Introduction Diuretics help to lower blood pressure. This reduces your risk of a heart attack and stroke. It also reduces your risk of kidney disease. Diuretics cause your kidneys to remove sodium and water. They also relax the blood vessel walls. These help lower your blood pressure. Examples · Chlorthalidone · Hydrochlorothiazide Possible side effects There are some common side effects. They are: · Too little potassium. · Feeling dizzy. · Rash. · Urinating a lot. · High blood sugar. (But this is not common.) You may have other side effects. Check the information that comes with your medicine. What to know about taking this medicine · You may take other medicines for blood pressure. Diuretics can help those work better. They can also prevent extra fluid in your body. · You may need to take potassium pills. Or you may have to watch how much potassium is in your food. Ask your doctor about this. · You may need blood tests to check your kidneys and your potassium level. · Take your medicines exactly as prescribed. Call your doctor if you think you are having a problem with your medicine. · Check with your doctor or pharmacist before you use any other medicines. This includes over-the-counter medicines. Make sure your doctor knows all of the medicines, vitamins, herbal products, and supplements you take. Taking some medicines together can cause problems. Where can you learn more? Go to http://laurent-otto.info/. Enter Y105 in the search box to learn more about \"Learning About Diuretics for High Blood Pressure. \" Current as of: April 3, 2017 Content Version: 11.3 © 9409-9688 [employer]. Care instructions adapted under license by [employer] (which disclaims liability or warranty for this information). If you have questions about a medical condition or this instruction, always ask your healthcare professional. [place] [address] any warranty or liability for your use of this information. Taking Aspirin to Prevent Heart Attack and Stroke: Care Instructions Your Care Instructions Aspirin acts as a \"blood thinner. \" It prevents blood clots from forming. When taken during and after a heart attack, it can reduce your chance of dying. And it's used if you have a stent in your coronary artery. Also, aspirin helps certain people lower their risk of a heart attack or stroke. Be sure you know what dose of aspirin to take and how often to take it. Low-dose aspirin is typically 81 mg. But the dose for daily aspirin can range from 81 mg to 325 mg. Taking aspirin every day can cause bleeding. It may not be safe if you have stomach ulcers. And it may not be safe if you have high blood pressure that is not controlled. If you take aspirin pills every day, do not take ones that have other ingredients such as caffeine or sodium. Before you start to take aspirin, tell your doctor all the medicines, vitamins, herbal products, and supplements you take. Follow-up care is a key part of your treatment and safety. Be sure to make and go to all appointments, and call your doctor if you are having problems. It's also a good idea to know your test results and keep a list of the medicines you take. How can you care for yourself at home? · Take aspirin with a full glass of water unless your doctor tells you not to. Do not lie down right after you take it. · If you have a stent in your coronary artery, take your aspirin as your heart doctor says to. If another doctor says to stop taking the aspirin for any reason, talk to your heart doctor before you stop. · Do not chew or crush the coated or sustained-release forms of aspirin. · Ask your doctor if you can drink alcohol while you take aspirin. And ask how much you can drink. Too much alcohol with aspirin can cause stomach bleeding. · Do not take aspirin if you are pregnant, unless your doctor says it is okay. · Keep all aspirin out of children's reach. · Throw aspirin away if it starts to smell like vinegar. · Do not take aspirin if you have gout or if you take prescription blood thinners, unless your doctor has told you to. · Do not take prescription or over-the-counter medicines, vitamins, herbal products, or supplements without talking to your doctor first. Patric [place] the label before you take another over-the-counter medicine. Many contain aspirin. So they could cause you to take too much aspirin. · Talk with your doctor before you take a pain medicine. Ask which type of medicine you can take and how to take it safely with aspirin. · Tell your doctor or dentist before a surgery or procedure that you take aspirin. He or she will tell you if you should stop taking aspirin before your surgery or procedure. Make sure that you understand exactly what your doctor wants you to do. Where can you learn more? Go to http://laurent-otto.info/. Enter G490 in the search box to learn more about \"Taking Aspirin to Prevent Heart Attack and Stroke: Care Instructions. \" Current as of: April 3, 2017 Content Version: 11.3 © 3012-5757 [employer]. Care instructions adapted under license by [employer] (which disclaims liability or warranty for this information). If you have questions about a medical condition or this instruction, always ask your healthcare professional. [place] [address] any warranty or liability for your use of this information. Hydrocodone/Acetaminophen (By mouth) Acetaminophen (e-kgrm-k-MIN-oh-fen), Hydrocodone Bitartrate (puu-ktln-GJC-done bye-TAR-trate) Treats pain. This medicine contains a narcotic pain reliever. Brand Name(s): Hycet, Lorcet, Lorcet HD, Lorcet Plus, Lortab 10/325, Lortab 5/325, Lortab 7.5/325, Lortab Elixir, Norco, Verdrocet, Vicodin, Vicodin ES, Vicodin HP, Xodol, Xodol 5/300 There may be other brand names for this medicine. When This Medicine Should Not Be Used: This medicine is not right for everyone. Do not use it if you had an allergic reaction to acetaminophen, hydrocodone, or other narcotic medicines, or stomach or bowel blockage (including paralytic ileus). How to Use This Medicine:  
Capsule, Liquid, Tablet · Your doctor will tell you how much medicine to use. Do not use more than directed. · An overdose can be dangerous. Follow directions carefully so you do not get too much medicine at one time. · Oral liquid: Measure the oral liquid medicine with a marked measuring spoon, oral syringe, or medicine cup. · Drink plenty of liquids to help avoid constipation. · This medicine should come with a Medication Guide. Ask your pharmacist for a copy if you do not have one. · Missed dose: Take a dose as soon as you remember.  If it is almost time for your next dose, wait until then and take a regular dose. Do not take extra medicine to make up for a missed dose. · Store the medicine in a closed container at room temperature, away from heat, moisture, and direct light. Flush any unused Norco® tablets down the toilet. Drugs and Foods to Avoid: Ask your doctor or pharmacist before using any other medicine, including over-the-counter medicines, vitamins, and herbal products. · Do not use this medicine if you are using or have used an MAO inhibitor within the past 14 days. · Some medicines can affect how hydrocodone/acetaminophen works. Tell your doctor if you are using any of the following: ¨ Carbamazepine, erythromycin, ketoconazole, mirtazapine, phenytoin, rifampin, ritonavir, tramadol, trazodone ¨ Diuretic (water pill) ¨ Medicine to treat depression or mental health problems ¨ Medicine to treat migraine headaches ¨ Phenothiazine medicine · Tell your doctor if you use anything else that makes you sleepy. Some examples are allergy medicine, narcotic pain medicine, and alcohol. Tell your doctor if you are using buprenorphine, butorphanol, nalbuphine, pentazocine, or a muscle relaxer. · Do not drink alcohol while you are using this medicine. Acetaminophen can damage your liver, and your risk is higher if you also drink alcohol. Warnings While Using This Medicine: · Tell your doctor if you are pregnant or breastfeeding, or if you have kidney disease, liver disease, lung or breathing problems, gallbladder or pancreas problems, an underactive thyroid, Jonathon disease, prostate problems, trouble urinating, stomach problems, or a history of head injury or brain tumor, seizures, alcohol or drug addiction. · This medicine may cause the following problems:  
¨ High risk of overdose, which can lead to death ¨ Respiratory depression (serious breathing problem that can be life-threatening) ¨ Liver problems ¨ Serious skin reactions ¨ Serotonin syndrome (when used with certain medicines) · This medicine can be habit-forming. Do not use more than your prescribed dose. Call your doctor if you think your medicine is not working. · This medicine may make you dizzy or drowsy. Do not drive or doing anything else that could be dangerous until you know how this medicine affects you. · This medicine contains acetaminophen. Read the labels of all other medicines you are using to see if they also contain acetaminophen, or ask your doctor or pharmacist. Rayne Cameron not use more than 4 grams (4,000 milligrams) total of acetaminophen in one day. · Tell any doctor or dentist who treats you that you are using this medicine. This medicine may affect certain medical test results. · This medicine may cause constipation, especially with long-term use. Ask your doctor if you should use a laxative to prevent and treat constipation. · This medicine could cause infertility. Talk with your doctor before using this medicine if you plan to have children. · Keep all medicine out of the reach of children. Never share your medicine with anyone. Possible Side Effects While Using This Medicine:  
Call your doctor right away if you notice any of these side effects: · Allergic reaction: Itching or hives, swelling in your face or hands, swelling or tingling in your mouth or throat, chest tightness, trouble breathing · Anxiety, restlessness, fast heartbeat, fever, sweating, muscle spasms, twitching, diarrhea, seeing or hearing things that are not there · Blistering, peeling, red skin rash · Blue lips, fingernails, or skin · Dark urine or pale stools, loss of appetite, nausea or vomiting, stomach pain, yellow skin or eyes · Extreme weakness, shallow breathing, slow heartbeat, sweating, seizures, cold or clammy skin · Lightheadedness, dizziness, fainting If you notice these less serious side effects, talk with your doctor: · Constipation, nausea, vomiting · Tiredness or sleepiness If you notice other side effects that you think are caused by this medicine, tell your doctor. Call your doctor for medical advice about side effects. You may report side effects to FDA at 6-225-HNG-9510 © 2017 [facility] Information is for End User's use only and may not be sold, redistributed or otherwise used for commercial purposes. The above information is an  only. It is not intended as medical advice for individual conditions or treatments. Talk to your doctor, nurse or pharmacist before following any medical regimen to see if it is safe and effective for you.

## 2017-10-11 NOTE — PERIOP NOTES
Dr. Rafe Bamberger notified of repeat blood sugar of 63. No orders received at this time. OR team at beside to take pt to surgery and Dr. Rafe Bamberger aware, states okay to go to surgery and blood sugar to be treated/monitored by anesthesia.

## 2017-10-11 NOTE — PROGRESS NOTES
Spiritual Care visit. Initial Visit, Post Surgery Consult. Visit and prayer after patient had returned fron surgery.     Visit by Kym To M.Ed., Th.B. ,Staff

## 2017-10-11 NOTE — ANESTHESIA POSTPROCEDURE EVALUATION
Post-Anesthesia Evaluation and Assessment    Patient: Joanna Hernandez MRN: [id number]  SSN: [id number]    YOB: 1940  Age: 65 y.o. Sex: female       Cardiovascular Function/Vital Signs  Visit Vitals    /66    Pulse 72    Temp 36.3 °C (97.3 °F)    Resp 15    Ht 4' 11\" (1.499 m)    Wt 71.5 kg (157 lb 9.6 oz)    SpO2 99%    BMI 31.83 kg/m2       Patient is status post general anesthesia for Procedure(s):  RIGHT CAROTID ARTERY ENDARTERECTOMY   . Nausea/Vomiting: None    Postoperative hydration reviewed and adequate. Pain:  Pain Scale 1: Numeric (0 - 10) (10/11/17 1326)  Pain Intensity 1: 8 (10/11/17 1326)   Managed    Neurological Status:   Neuro (WDL): Within Defined Limits (10/11/17 0603)  Neuro  Neurologic State: Alert;Eyes open spontaneously (10/11/17 1230)  Orientation Level: Oriented to person;Oriented to place;Oriented to situation;Disoriented to time (10/11/17 1230)  Cognition: Follows commands;Poor safety awareness (10/11/17 1230)  Speech: Clear (10/11/17 1230)  Assessment L Pupil: Brisk;Round (10/11/17 1230)  Size L Pupil (mm): 3 (10/11/17 1230)  Assessment R Pupil: Brisk;Round (10/11/17 1230)  Size R Pupil (mm): 3 (10/11/17 1230)  LUE Motor Response: Spontaneous ; Purposeful; Moderate (10/11/17 1050)  LLE Motor Response: Spontaneous ; Purposeful; Moderate (10/11/17 1050)  RUE Motor Response: Spontaneous ; Purposeful; Moderate (10/11/17 1050)  RLE Motor Response: Spontaneous ; Purposeful; Moderate (10/11/17 1050)   At baseline    Mental Status and Level of Consciousness: Arousable    Pulmonary Status:   O2 Device: Nasal cannula (10/11/17 1233)   Adequate oxygenation and airway patent    Complications related to anesthesia: None    Post-anesthesia assessment completed.  No concerns    Signed By: Raymundo Staton MD     October 11, 2017

## 2017-10-11 NOTE — PROGRESS NOTES
TRANSFER - IN REPORT:    Verbal report received from THE [facility]) on Edith  being received from OR(unit) for routine post - op      Report consisted of patients Situation, Background, Assessment and   Recommendations(SBAR). Information from the following report(s) OR Summary, Procedure Summary and Intake/Output was reviewed with the receiving nurse. Opportunity for questions and clarification was provided. Assessment completed upon patients arrival to unit and care assumed. Primary RN is Gary Torres. Dual skin assessment completed. No redness or breakdown noted.

## 2017-10-12 LAB
ANION GAP SERPL CALC-SCNC: 12 MMOL/L (ref 7–16)
BUN SERPL-MCNC: 14 MG/DL (ref 8–23)
CALCIUM SERPL-MCNC: 8.7 MG/DL (ref 8.3–10.4)
CHLORIDE SERPL-SCNC: 100 MMOL/L (ref 98–107)
CO2 SERPL-SCNC: 27 MMOL/L (ref 21–32)
CREAT SERPL-MCNC: 1.07 MG/DL (ref 0.6–1)
GLUCOSE BLD STRIP.AUTO-MCNC: 115 MG/DL (ref 65–100)
GLUCOSE BLD STRIP.AUTO-MCNC: 148 MG/DL (ref 65–100)
GLUCOSE BLD STRIP.AUTO-MCNC: 169 MG/DL (ref 65–100)
GLUCOSE BLD STRIP.AUTO-MCNC: 212 MG/DL (ref 65–100)
GLUCOSE SERPL-MCNC: 129 MG/DL (ref 65–100)
MAGNESIUM SERPL-MCNC: 2 MG/DL (ref 1.8–2.4)
POTASSIUM SERPL-SCNC: 4.1 MMOL/L (ref 3.5–5.1)
SODIUM SERPL-SCNC: 139 MMOL/L (ref 136–145)

## 2017-10-12 PROCEDURE — 82962 GLUCOSE BLOOD TEST: CPT

## 2017-10-12 PROCEDURE — 65610000006 HC RM INTENSIVE CARE

## 2017-10-12 PROCEDURE — 74011250637 HC RX REV CODE- 250/637: Performed by: NURSE PRACTITIONER

## 2017-10-12 PROCEDURE — 74011250636 HC RX REV CODE- 250/636: Performed by: SURGERY

## 2017-10-12 PROCEDURE — 74011636637 HC RX REV CODE- 636/637: Performed by: SURGERY

## 2017-10-12 PROCEDURE — 36415 COLL VENOUS BLD VENIPUNCTURE: CPT | Performed by: NURSE PRACTITIONER

## 2017-10-12 PROCEDURE — 83735 ASSAY OF MAGNESIUM: CPT | Performed by: NURSE PRACTITIONER

## 2017-10-12 PROCEDURE — 74011250637 HC RX REV CODE- 250/637: Performed by: SURGERY

## 2017-10-12 PROCEDURE — 80048 BASIC METABOLIC PNL TOTAL CA: CPT | Performed by: NURSE PRACTITIONER

## 2017-10-12 PROCEDURE — 74011250637 HC RX REV CODE- 250/637: Performed by: INTERNAL MEDICINE

## 2017-10-12 RX ORDER — HYDRALAZINE HYDROCHLORIDE 20 MG/ML
10 INJECTION INTRAMUSCULAR; INTRAVENOUS
Status: DISCONTINUED | OUTPATIENT
Start: 2017-10-12 | End: 2017-10-13 | Stop reason: HOSPADM

## 2017-10-12 RX ORDER — CARVEDILOL 3.12 MG/1
12.5 TABLET ORAL 2 TIMES DAILY WITH MEALS
Status: DISCONTINUED | OUTPATIENT
Start: 2017-10-12 | End: 2017-10-13 | Stop reason: HOSPADM

## 2017-10-12 RX ORDER — HYDROCODONE BITARTRATE AND ACETAMINOPHEN 5; 325 MG/1; MG/1
1 TABLET ORAL
Qty: 25 TAB | Refills: 0 | Status: SHIPPED | OUTPATIENT
Start: 2017-10-12 | End: 2018-02-09

## 2017-10-12 RX ORDER — ATORVASTATIN CALCIUM 40 MG/1
40 TABLET, FILM COATED ORAL
Status: DISCONTINUED | OUTPATIENT
Start: 2017-10-12 | End: 2017-10-13 | Stop reason: HOSPADM

## 2017-10-12 RX ORDER — LOSARTAN POTASSIUM 25 MG/1
25 TABLET ORAL ONCE
Status: COMPLETED | OUTPATIENT
Start: 2017-10-12 | End: 2017-10-12

## 2017-10-12 RX ORDER — PANTOPRAZOLE SODIUM 40 MG/1
40 TABLET, DELAYED RELEASE ORAL
Status: DISCONTINUED | OUTPATIENT
Start: 2017-10-12 | End: 2017-10-13 | Stop reason: HOSPADM

## 2017-10-12 RX ORDER — PANTOPRAZOLE SODIUM 40 MG/1
40 TABLET, DELAYED RELEASE ORAL
Status: DISCONTINUED | OUTPATIENT
Start: 2017-10-13 | End: 2017-10-12

## 2017-10-12 RX ORDER — AMLODIPINE BESYLATE 5 MG/1
5 TABLET ORAL DAILY
Status: DISCONTINUED | OUTPATIENT
Start: 2017-10-12 | End: 2017-10-12

## 2017-10-12 RX ORDER — ACETAMINOPHEN 325 MG/1
650 TABLET ORAL
Status: DISCONTINUED | OUTPATIENT
Start: 2017-10-12 | End: 2017-10-13 | Stop reason: HOSPADM

## 2017-10-12 RX ADMIN — FUROSEMIDE 20 MG: 40 TABLET ORAL at 07:37

## 2017-10-12 RX ADMIN — LOSARTAN POTASSIUM 25 MG: 25 TABLET ORAL at 14:09

## 2017-10-12 RX ADMIN — ASPIRIN 81 MG: 81 TABLET, COATED ORAL at 07:36

## 2017-10-12 RX ADMIN — CARVEDILOL 6.25 MG: 3.12 TABLET, FILM COATED ORAL at 05:50

## 2017-10-12 RX ADMIN — ATORVASTATIN CALCIUM 40 MG: 40 TABLET, FILM COATED ORAL at 21:15

## 2017-10-12 RX ADMIN — AMLODIPINE BESYLATE 5 MG: 5 TABLET ORAL at 11:02

## 2017-10-12 RX ADMIN — CARVEDILOL 12.5 MG: 3.12 TABLET, FILM COATED ORAL at 18:26

## 2017-10-12 RX ADMIN — CEFAZOLIN 2 G: 1 INJECTION, POWDER, FOR SOLUTION INTRAMUSCULAR; INTRAVENOUS; PARENTERAL at 01:57

## 2017-10-12 RX ADMIN — PANTOPRAZOLE SODIUM 40 MG: 40 TABLET, DELAYED RELEASE ORAL at 19:21

## 2017-10-12 RX ADMIN — INSULIN HUMAN 2 UNITS: 100 INJECTION, SOLUTION PARENTERAL at 11:54

## 2017-10-12 RX ADMIN — LOSARTAN POTASSIUM 25 MG: 25 TABLET ORAL at 05:50

## 2017-10-12 RX ADMIN — INSULIN HUMAN 4 UNITS: 100 INJECTION, SOLUTION PARENTERAL at 21:16

## 2017-10-12 RX ADMIN — Medication 10 ML: at 05:52

## 2017-10-12 RX ADMIN — Medication 10 ML: at 21:16

## 2017-10-12 RX ADMIN — ACETAMINOPHEN 650 MG: 325 TABLET ORAL at 09:04

## 2017-10-12 RX ADMIN — Medication 10 ML: at 12:50

## 2017-10-12 NOTE — PROGRESS NOTES
Orders received for [facility] Cardiology consult.  Consult paged to office,  states will refer consult to Dr. Melvi Gomez and AUBREY Huggins.

## 2017-10-12 NOTE — PROGRESS NOTES
Dr. Nora Correa at bedside for re-eval. Plan to administer 5 mg norvasc now, see MAR, and continue to monitor.

## 2017-10-12 NOTE — PROGRESS NOTES
[address]   [address] FAX: [phone]         VASCULAR SURGERY FLOOR PROGRESS NOTE    Admit Date: 10/11/2017  POD: 1 Day Post-Op    Procedure:  Procedure(s):  RIGHT CAROTID ARTERY ENDARTERECTOMY       Subjective:     Patient has no new complaints. Objective:     Vitals:  Blood pressure 144/59, pulse 76, temperature 97.9 °F (36.6 °C), resp. rate 17, height 4' 11\" (1.499 m), weight 157 lb 9.6 oz (71.5 kg), SpO2 94 %, not currently breastfeeding. Temp (24hrs), Av.8 °F (36.6 °C), Min:97.3 °F (36.3 °C), Max:98.2 °F (36.8 °C)      Intake / Output:    Intake/Output Summary (Last 24 hours) at 10/12/17 0540  Last data filed at 10/12/17 0258   Gross per 24 hour   Intake           3754.5 ml   Output             2100 ml   Net           1654.5 ml       Physical Exam:    Constitutional: she appears well-developed. No distress. HENT:   Head: Atraumatic. Eyes: Pupils are equal, round, and reactive to light. Neck: Normal range of motion. Cardiovascular: Regular rhythm. Pulmonary/Chest: Effort normal and breath sounds normal. No respiratory distress. Abdominal: Soft. Bowel sounds are normal. she exhibits no distension. There is no tenderness. There is no guarding. No hernia. Musculoskeletal: Normal range of motion. Neurological: She is alert. CN II- XII grossly intact  Vascular: Neurologically intact    Labs: No results for input(s): HGB, WBC, K, GLU, HGBEXT in the last 72 hours.     No lab exists for component:  CREA    Data Review     Assessment:     Patient Active Problem List    Diagnosis Date Noted    Carotid artery disease ([facility] [address] 10/11/2017    S/P coronary artery stent placement 2016    Paroxysmal SVT (supraventricular tachycardia) ([facility] [address] 2016    CHF (congestive heart failure) ([facility] [address] 2016    Carotid artery stenosis without cerebral infarction 2016    Chronic kidney disease, stage III (moderate) 2016    CAD (coronary artery disease)     GERD (gastroesophageal reflux disease)     Arthritis     Full dentures     Ventricular tachycardia ([facility] [address]     Palpitations 05/28/2013    HTN (hypertension), malignant 05/09/2013    Diabetes mellitus type 2, controlled ([facility] [address] 05/09/2013    Coronary atherosclerosis of native coronary artery 05/09/2013    Dyslipidemia 05/09/2013    Shortness of breath 05/09/2013    Chronic systolic heart failure ([facility] 05/09/2013    Pelvic mass 09/14/2009       Plan/Recommendations/Medical Decision Making:     Plan discharge today follow-up in 2 weeks  Elements of this note have been dictated using speech recognition software. As a result, errors of speech recognition may have occurred.

## 2017-10-12 NOTE — CONSULTS
[address] Cardiology Consult                Date of  Admission: 10/11/2017  5:06 AM     Primary Care Physician: Dr. Lizbet Griffiths  Primary Cardiologist: Dr. Mora Escobedo  Referring Physician: Dr. Jolanta Chakraborty Physician: Dr. Caroline Hermosillo    CC/Reason for consult: Hypertension      Dago Campos is a 65 y.o.  female with a PMHx of CAD (MI 2000 and PCI 2008), HTN, DM II, HLD, CHF (last EF 50-55%), GERD and carotid artery disease admitted s/p R CEA by Vascular Surgery. She was originally to go home today, but was found to have elevated SBPs up to 200s. She apparently had some SBP in the 200s intra-op which required Cardene gtt. This was weaned off post-op and home BP meds resumed -- 6.25mg Coreg started last night and 25mg Cozaar started this AM. However, BP remained elevated and 5mg Norvasc was given. BP continued to climb and Cardene gtt was restarted at 1245. BP now 139/63 on 5mg/hr Cardene gtt. She reports that BP has been \"ok\" at home. Last UC OV /60, and 126/64 (L), 146/74 (R). She reports that she did not take her BP meds the AM of surgery as directed by per-op RN. She denies associated CP/pressure, palpitations, orthopnea, edema, syncope, HA or vision changes. She is having a few PVCs on monitor at present.     Patient Active Problem List   Diagnosis Code    HTN (hypertension), malignant I10    Diabetes mellitus type 2, controlled ([facility] [address] E11.9    Coronary atherosclerosis of native coronary artery I25.10    Dyslipidemia E78.5    Shortness of breath R06.02    Chronic systolic heart failure (HCC) I50.22    Paroxysmal SVT (supraventricular tachycardia) (HCC) I47.1    CHF (congestive heart failure) (HCC) I50.9    Carotid artery stenosis without cerebral infarction I65.29    Chronic kidney disease, stage III (moderate) N18.3    CAD (coronary artery disease) I25.10    GERD (gastroesophageal reflux disease) K21.9    Arthritis M19.90    Full dentures Z97.2, K08.109    Ventricular tachycardia ([facility] [address] I47.2    Pelvic mass R19.00    Palpitations R00.2    S/P coronary artery stent placement Z95.5    Carotid artery disease (HCC) I77.9       Past Medical History:   Diagnosis Date    Arthritis     osteoarthritis - L leg     CAD (coronary artery disease)     stent placement 2008- 2000 MI     Carotid artery stenosis without cerebral infarction 7/7/2016    Asx right carotid stenosis found. Carotid stenting 6/09. U/S 2015 showing only minor obstruction bilaterally     CHF (congestive heart failure) ([facility] [address] 7/7/2016    resolved    CHF (congestive heart failure) ([address] 7/7/2016    Chronic kidney disease     Chronic kidney disease, stage III (moderate) 7/7/2016    Per Dr. Theresa Kemp Chronic kidney disease, stage III (moderate) 7/7/2016    Diabetes ([facility] [address]     checks QD, normal 100, hyposymptoms at 79    Full dentures     upper & lower    GERD (gastroesophageal reflux disease)     controlled with meds     Hypercholesterolemia     Hypertension     controlled with meds     Palpitations 5/28/2013    Paroxysmal SVT (supraventricular tachycardia) ([facility]) 7/7/2016    Rapid atrial     Paroxysmal SVT (supraventricular tachycardia) ([address] 7/7/2016    Pelvic mass 9/14/2009    Ventricular tachycardia ([facility] [address]       Past Surgical History:   Procedure Laterality Date    CARDIAC SURG PROCEDURE UNLIST      cardiac stent 2000     VASCULAR SURGERY PROCEDURE UNLIST      carotid artery stent placement (side unknown).       No Known Allergies   Family History   Problem Relation Age of Onset    Diabetes Father     Hypertension Father     Heart Disease Father     Diabetes Brother     Heart Disease Brother     No Known Problems Mother         Current Facility-Administered Medications   Medication Dose Route Frequency    acetaminophen (TYLENOL) tablet 650 mg  650 mg Oral Q4H PRN    losartan (COZAAR) tablet 25 mg  25 mg Oral ONCE    carvedilol (COREG) tablet 12.5 mg  12.5 mg Oral BID WITH MEALS    hydrALAZINE (APRESOLINE) 20 mg/mL injection 10 mg  10 mg IntraVENous Q6H PRN    lactated Ringers infusion  25 mL/hr IntraVENous CONTINUOUS    aspirin delayed-release tablet 81 mg  81 mg Oral DAILY    furosemide (LASIX) tablet 20 mg  20 mg Oral DAILY    nitroglycerin (NITROSTAT) tablet 0.4 mg  0.4 mg SubLINGual Q5MIN PRN    sodium chloride (NS) flush 5-10 mL  5-10 mL IntraVENous Q8H    sodium chloride (NS) flush 5-10 mL  5-10 mL IntraVENous PRN    HYDROcodone-acetaminophen (NORCO) 5-325 mg per tablet 1 Tab  1 Tab Oral Q4H PRN    morphine injection 2 mg  2 mg IntraVENous Q4H PRN    ondansetron (ZOFRAN) injection 4 mg  4 mg IntraVENous Q4H PRN    0.9% sodium chloride infusion  75 mL/hr IntraVENous CONTINUOUS    niCARdipine (CARDENE) 25 mg in 0.9% sodium chloride 250 mL infusion  0-15 mg/hr IntraVENous TITRATE    insulin regular (NOVOLIN R, HUMULIN R) injection   SubCUTAneous AC&HS    losartan (COZAAR) tablet 25 mg  25 mg Oral DAILY       Review of Systems   Constitutional: Negative for chills, diaphoresis, fever, malaise/fatigue and weight loss. HENT: Negative. Negative for congestion and tinnitus. Eyes: Negative. Negative for blurred vision and double vision. Respiratory: Negative for cough, hemoptysis, shortness of breath, wheezing and stridor. Cardiovascular: Negative for chest pain, palpitations, orthopnea and leg swelling. Gastrointestinal: Negative for abdominal pain, constipation, diarrhea, heartburn, nausea and vomiting. Genitourinary: Negative for dysuria, frequency and urgency. Musculoskeletal: Negative. Negative for falls and myalgias. Skin: Negative. Neurological: Negative for dizziness, tingling, tremors, sensory change, seizures, weakness and headaches. Endo/Heme/Allergies: Negative. Psychiatric/Behavioral: Negative.          Physical Exam  Vitals:    10/12/17 1158 10/12/17 1214 10/12/17 1228 10/12/17 1229   BP: 188/88 195/86  (!) 209/95   Pulse: 60 61  70   Resp: 23 16 20    Temp: SpO2: 100% 100%  97%   Weight:       Height:           Physical Exam:  Physical Exam   Constitutional: She is oriented to person, place, and time and well-developed, well-nourished, and in no distress. HENT:   Head: Normocephalic and atraumatic. Mouth/Throat: Oropharynx is clear and moist.   Eyes: Conjunctivae and EOM are normal. Pupils are equal, round, and reactive to light. No scleral icterus. Neck: Normal range of motion. Neck supple. No JVD present. No tracheal deviation present. Incision R neck c/d/i   Cardiovascular: Normal rate, regular rhythm and intact distal pulses. Frequent extrasystoles are present. Exam reveals no friction rub. Murmur heard. Pulmonary/Chest: Effort normal and breath sounds normal. No stridor. No respiratory distress. She has no wheezes. She has no rales. Abdominal: Soft. Bowel sounds are normal. She exhibits no distension. There is no tenderness. Musculoskeletal: Normal range of motion. She exhibits no edema. Neurological: She is alert and oriented to person, place, and time. No cranial nerve deficit. Skin: Skin is warm and dry. No rash noted. No erythema.    Psychiatric: Mood, memory, affect and judgment normal.       Cardiographics    Telemetry: SR, HR 78, frequent PVCs  ECG: none this admit, last 2/2017 SR, RBB, PVCs  Echocardiogram: 5/2015 -- LV normal size, mild concentric hypertrophy, no WMA, EF 50-55%, grade 1 DD, mod LA dilation, mild MR/TR    Labs: No labs     Assessment/Plan:     Assessment:      Principal Problem:    Carotid artery disease -- s/p CEA yesterday -- defer to Dr. Mcmahon Raoul    Active Problems:    HTN (hypertension), malignant -- BPs from OV notes stable on home meds -- wuld like to increase current meds prior to addition on 3rd medication, will give additional 25mg cozaar now, increase coreg to 12.5mg BID, PRN hydralazine, wean off Cardene and d/c norvasc      Dyslipidemia -- cont statin      CHF (congestive heart failure) -- stable, cont ARB, BB and diuretic      Chronic kidney disease, stage III (moderate) -- last Cr 0.88 on BMP 10/6, monitor renal function - check AM labs, I/O      CAD (coronary artery disease) -- stable, cont ARB, BB, statin, ASA      Overview: stent placement 2008, MI 2000       GERD (gastroesophageal reflux disease) -- cont PPI      Overview: controlled with meds         Thank you very much for this referral. We appreciate the opportunity to participate in this patient's care. We will follow along with above stated plan.     Delphine Lind, NP-C  Consulting MD: Dr. Tere Cheung

## 2017-10-12 NOTE — PROGRESS NOTES
Bedside shift report given to Elvia Osorio RN, for continued care. BP and HR remain within ordered parameters, off nicardipine gtt x 4 hr, receiving only oral antihypertensives per MAR at this time. Continues without changes in post-carotid assessment.

## 2017-10-12 NOTE — PROGRESS NOTES
Dr. Joya Mckinnon at bedside for re-eval. Orders to restart nicardipine gtt now, see MAR, and to monitor pt overnight.

## 2017-10-12 NOTE — PROGRESS NOTES
SBP sustaining 160s-170s despite cozaar and coreg already administered per MAR. Pt's assessment remains unchanged, denies HA, visual changes, dizziness, or any other assessment changes. Dr. Guilherme Reed notified, will place orders shortly.

## 2017-10-13 VITALS
OXYGEN SATURATION: 88 % | BODY MASS INDEX: 35.33 KG/M2 | WEIGHT: 175.27 LBS | DIASTOLIC BLOOD PRESSURE: 63 MMHG | TEMPERATURE: 97 F | RESPIRATION RATE: 17 BRPM | HEART RATE: 75 BPM | HEIGHT: 59 IN | SYSTOLIC BLOOD PRESSURE: 139 MMHG

## 2017-10-13 LAB
ANION GAP SERPL CALC-SCNC: 9 MMOL/L (ref 7–16)
BUN SERPL-MCNC: 13 MG/DL (ref 8–23)
CALCIUM SERPL-MCNC: 8.5 MG/DL (ref 8.3–10.4)
CHLORIDE SERPL-SCNC: 103 MMOL/L (ref 98–107)
CO2 SERPL-SCNC: 31 MMOL/L (ref 21–32)
CREAT SERPL-MCNC: 0.93 MG/DL (ref 0.6–1)
GLUCOSE BLD STRIP.AUTO-MCNC: 151 MG/DL (ref 65–100)
GLUCOSE SERPL-MCNC: 132 MG/DL (ref 65–100)
MAGNESIUM SERPL-MCNC: 2 MG/DL (ref 1.8–2.4)
POTASSIUM SERPL-SCNC: 4.1 MMOL/L (ref 3.5–5.1)
SODIUM SERPL-SCNC: 143 MMOL/L (ref 136–145)

## 2017-10-13 PROCEDURE — 74011250637 HC RX REV CODE- 250/637: Performed by: SURGERY

## 2017-10-13 PROCEDURE — 80048 BASIC METABOLIC PNL TOTAL CA: CPT | Performed by: NURSE PRACTITIONER

## 2017-10-13 PROCEDURE — 82962 GLUCOSE BLOOD TEST: CPT

## 2017-10-13 PROCEDURE — 36415 COLL VENOUS BLD VENIPUNCTURE: CPT | Performed by: NURSE PRACTITIONER

## 2017-10-13 PROCEDURE — 74011636637 HC RX REV CODE- 636/637: Performed by: SURGERY

## 2017-10-13 PROCEDURE — 74011250637 HC RX REV CODE- 250/637: Performed by: NURSE PRACTITIONER

## 2017-10-13 PROCEDURE — 83735 ASSAY OF MAGNESIUM: CPT | Performed by: NURSE PRACTITIONER

## 2017-10-13 PROCEDURE — 74011250637 HC RX REV CODE- 250/637: Performed by: INTERNAL MEDICINE

## 2017-10-13 RX ADMIN — Medication 10 ML: at 05:45

## 2017-10-13 RX ADMIN — FUROSEMIDE 20 MG: 40 TABLET ORAL at 08:49

## 2017-10-13 RX ADMIN — INSULIN HUMAN 2 UNITS: 100 INJECTION, SOLUTION PARENTERAL at 06:34

## 2017-10-13 RX ADMIN — CARVEDILOL 12.5 MG: 3.12 TABLET, FILM COATED ORAL at 08:49

## 2017-10-13 RX ADMIN — LOSARTAN POTASSIUM 25 MG: 25 TABLET ORAL at 08:49

## 2017-10-13 RX ADMIN — ASPIRIN 81 MG: 81 TABLET, COATED ORAL at 08:49

## 2017-10-13 RX ADMIN — PANTOPRAZOLE SODIUM 40 MG: 40 TABLET, DELAYED RELEASE ORAL at 06:31

## 2017-10-13 NOTE — DISCHARGE INSTRUCTIONS
DISCHARGE SUMMARY from Nurse    The following personal items are in your possession at time of discharge:    Dental Appliances: None  Visual Aid: None  Hearing Aids/Status: Does not own  Home Medications: None  Jewelry: None  Clothing: None  Other Valuables: None  Personal Items Sent to Safe: none          PATIENT INSTRUCTIONS:    After general anesthesia or intravenous sedation, for 24 hours or while taking prescription Narcotics:  · Limit your activities  · Do not drive and operate hazardous machinery  · Do not make important personal or business decisions  · Do  not drink alcoholic beverages  · If you have not urinated within 8 hours after discharge, please contact your surgeon on call. Report the following to your surgeon:  · Excessive pain, swelling, redness or odor of or around the surgical area  · Temperature over 100.5  · Nausea and vomiting lasting longer than 4 hours or if unable to take medications  · Any signs of decreased circulation or nerve impairment to extremity: change in color, persistent  numbness, tingling, coldness or increase pain  · Any questions      *  Please give a list of your current medications to your Primary Care Provider. *  Please update this list whenever your medications are discontinued, doses are      changed, or new medications (including over-the-counter products) are added. *  Please carry medication information at all times in case of emergency situations. These are general instructions for a healthy lifestyle:    No smoking/ No tobacco products/ Avoid exposure to second hand smoke    Surgeon General's Warning:  Quitting smoking now greatly reduces serious risk to your health.     Obesity, smoking, and sedentary lifestyle greatly increases your risk for illness    A healthy diet, regular physical exercise & weight monitoring are important for maintaining a healthy lifestyle    You may be retaining fluid if you have a history of heart failure or if you experience any of the following symptoms:  Weight gain of 3 pounds or more overnight or 5 pounds in a week, increased swelling in our hands or feet or shortness of breath while lying flat in bed. Please call your doctor as soon as you notice any of these symptoms; do not wait until your next office visit. Recognize signs and symptoms of STROKE:    F-face looks uneven    A-arms unable to move or move unevenly    S-speech slurred or non-existent    T-time-call 911 as soon as signs and symptoms begin-DO NOT go       Back to bed or wait to see if you get better-TIME IS BRAIN. Warning Signs of HEART ATTACK     Call 911 if you have these symptoms:   Chest discomfort. Most heart attacks involve discomfort in the center of the chest that lasts more than a few minutes, or that goes away and comes back. It can feel like uncomfortable pressure, squeezing, fullness, or pain.  Discomfort in other areas of the upper body. Symptoms can include pain or discomfort in one or both arms, the back, neck, jaw, or stomach.  Shortness of breath with or without chest discomfort.  Other signs may include breaking out in a cold sweat, nausea, or lightheadedness. Don't wait more than five minutes to call 911 - MINUTES MATTER! Fast action can save your life. Calling 911 is almost always the fastest way to get lifesaving treatment. Emergency Medical Services staff can begin treatment when they arrive -- up to an hour sooner than if someone gets to the hospital by car. The discharge information has been reviewed with the patient. The patient verbalized understanding. Discharge medications reviewed with the patient and appropriate educational materials and side effects teaching were provided.

## 2017-10-13 NOTE — PROGRESS NOTES
[address]   [address] FAX: [phone]         VASCULAR SURGERY FLOOR PROGRESS NOTE    Admit Date: 10/11/2017  POD: 2 Days Post-Op    Procedure:  Procedure(s):  RIGHT CAROTID ARTERY ENDARTERECTOMY       Subjective:     Patient has no new complaints. Objective:     Vitals:  Blood pressure 120/65, pulse 75, temperature 97 °F (36.1 °C), resp. rate 15, height 4' 11\" (1.499 m), weight 175 lb 4.3 oz (79.5 kg), SpO2 96 %, not currently breastfeeding. Temp (24hrs), Av.8 °F (36.6 °C), Min:96.8 °F (36 °C), Max:98.6 °F (37 °C)      Intake / Output:    Intake/Output Summary (Last 24 hours) at 10/13/17 0724  Last data filed at 10/13/17 0500   Gross per 24 hour   Intake           1487.5 ml   Output             3600 ml   Net          -2112.5 ml       Physical Exam:    Constitutional: she appears well-developed. No distress. HENT:   Head: Atraumatic. Eyes: Pupils are equal, round, and reactive to light. Neck: Normal range of motion. Cardiovascular: Regular rhythm. Pulmonary/Chest: Effort normal and breath sounds normal. No respiratory distress. Abdominal: Soft. Bowel sounds are normal. she exhibits no distension. There is no tenderness. There is no guarding. No hernia. Musculoskeletal: Normal range of motion. Neurological: She is alert.  CN II- XII grossly intact  Vascular: Neurologically intact    Labs:   Recent Labs      10/13/17   0223  10/12/17   1723   K  4.1  4.1   GLU  132*  129*       Data Review     Assessment:     Patient Active Problem List    Diagnosis Date Noted    Carotid artery disease ([facility] 10/11/2017    S/P coronary artery stent placement 2016    Paroxysmal SVT (supraventricular tachycardia) ([facility] 2016    CHF (congestive heart failure) ([facility] 2016    Carotid artery stenosis without cerebral infarction 2016    Chronic kidney disease, stage III (moderate) 2016    CAD (coronary artery disease)     GERD (gastroesophageal reflux disease)     Arthritis     Full dentures     [facility]ricular tachycardia ([facility]     Palpitations 05/28/2013    HTN (hypertension), malignant 05/09/2013    Diabetes mellitus type 2, controlled ([facility] 05/09/2013    Coronary atherosclerosis of native coronary artery 05/09/2013    Dyslipidemia 05/09/2013    Shortness of breath 05/09/2013    Chronic systolic heart failure ([facility] [facility] 05/09/2013    Pelvic mass 09/14/2009       Plan/Recommendations/Medical Decision Making:     Neurologically intact  Blood pressure stable follow-up vascular 2 weeks    Elements of this note have been dictated using speech recognition software. As a result, errors of speech recognition may have occurred.

## 2017-10-13 NOTE — DISCHARGE SUMMARY
[address] FAX: [phone]          Physician Discharge Summary     Patient: Sophia Wilson MRN: [id number]  SSN: [id number]    YOB: 1940  Age: 65 y.o. Sex: female       Admit Date: 10/11/2017    Discharge Date: 10/13/2017      Admitting Physician: Justin Mendoza MD     Discharge Physician: Justin Mendoza MD    Admission Diagnoses: Carotid stenosis, right [I65.21]    Discharge Diagnoses:   Problem List as of 10/13/2017  Date Reviewed: 7/27/2017          Codes Class Noted - Resolved    * (Principal)Carotid artery disease ([facility] ICD-10-CM: I77.9  ICD-9-CM: 447.9  10/11/2017 - Present        S/P coronary artery stent placement (Chronic) ICD-10-CM: Z95.5  ICD-9-CM: V45.82  7/20/2016 - Present        Paroxysmal SVT (supraventricular tachycardia) ([facility] ICD-10-CM: I47.1  ICD-9-CM: 427.0  7/7/2016 - Present    Overview Addendum 7/7/2016 12:34 PM by Reese Turner     Rapid atrial : Seven beat run seen on event monitor in June 2013             CHF (congestive heart failure) ([facility] ICD-10-CM: I50.9  ICD-9-CM: 428.0  7/7/2016 - Present    Overview Signed 7/7/2016 12:35 PM by Reese Turner     resolved             Carotid artery stenosis without cerebral infarction ICD-10-CM: I65.29  ICD-9-CM: 433.10  7/7/2016 - Present    Overview Signed 7/7/2016 12:36 PM by Reese Turner     Asx right carotid stenosis found. Carotid stenting 6/09.  U/S 2015 showing only minor obstruction bilaterally              Chronic kidney disease, stage III (moderate) ICD-10-CM: N18.3  ICD-9-CM: 585.3  7/7/2016 - Present    Overview Signed 7/7/2016 12:36 PM by Reese Turner     Per Dr. Ozzy Cruz             CAD (coronary artery disease) ICD-10-CM: I25.10  ICD-9-CM: 414.00  Unknown - Present    Overview Signed 7/7/2016  1:50 PM by Alana Hinkle     stent placement 2008- 2000 MI              GERD (gastroesophageal reflux disease) ICD-10-CM: K21.9  ICD-9-CM: 530.81  Unknown - Present    Overview Signed 7/7/2016  1:50 PM by Be Jj     controlled with meds              Arthritis ICD-10-CM: M19.90  ICD-9-CM: 716.90  Unknown - Present    Overview Signed 7/7/2016  1:51 PM by Be Jj     osteoarthritis - L leg              Full dentures ICD-10-CM: Z97.2, A68.332  ICD-9-CM: V45.84  Unknown - Present    Overview Signed 7/7/2016  1:51 PM by eB Jj     upper & lower             Ventricular tachycardia ([facility] ICD-10-CM: I47.2  ICD-9-CM: 427.1  Unknown - Present        Palpitations ICD-10-CM: R00.2  ICD-9-CM: 785.1  5/28/2013 - Present        HTN (hypertension), malignant ICD-10-CM: I10  ICD-9-CM: 401.0  5/9/2013 - Present        Diabetes mellitus type 2, controlled ([facility] ICD-10-CM: E11.9  ICD-9-CM: 250.00  5/9/2013 - Present        Coronary atherosclerosis of native coronary artery ICD-10-CM: I25.10  ICD-9-CM: 414.01  5/9/2013 - Present        Dyslipidemia ICD-10-CM: E78.5  ICD-9-CM: 272.4  5/9/2013 - Present        Shortness of breath ICD-10-CM: R06.02  ICD-9-CM: 786.05  5/9/2013 - Present        Chronic systolic heart failure ([facility] ICD-10-CM: I50.22  ICD-9-CM: 428.22  5/9/2013 - Present        Pelvic mass ICD-10-CM: R19.00  ICD-9-CM: 789.30  9/14/2009 - Present        RESOLVED: Acute diastolic heart failure ([facility] ICD-10-CM: I50.31  ICD-9-CM: 428.31  5/9/2013 - 5/9/2013               Procedures for this admission: Procedure(s):  RIGHT CAROTID ARTERY ENDARTERECTOMY       Discharged Condition: stable    Hospital Course: Admitted overnight ICU for observation.   Patient stated additional 1 night for blood pressure control    Consults: Cardiology for hypertension    Significant Diagnostic Studies: None    Treatments: Right carotid endarterectomy    Discharge Exam: Neurologically intact moving all 4 extremities    Disposition: Discharge home follow vascular in 2 weeks    Patient Instructions:   Current Discharge Medication List      START taking these medications    Details   HYDROcodone-acetaminophen (NORCO) 5-325 mg per tablet Take 1 Tab by mouth every four (4) hours as needed. Max Daily Amount: 6 Tabs. Qty: 25 Tab, Refills: 0         CONTINUE these medications which have NOT CHANGED    Details   insulin aspart (NOVOLOG FLEXPEN) 100 unit/mL inpn 10 Units by SubCUTAneous route daily. docusate sodium (STOOL SOFTENER) 100 mg capsule Take 100 mg by mouth two (2) times a day. atorvastatin (LIPITOR) 40 mg tablet Take  by mouth daily. aspirin delayed-release 81 mg tablet Take  by mouth daily. omeprazole (PRILOSEC) 20 mg capsule Take 20 mg by mouth daily. losartan (COZAAR) 25 mg tablet Take  by mouth daily. insulin detemir (LEVEMIR) 100 unit/mL injection 50 Units by SubCUTAneous route nightly. carvedilol (COREG) 6.25 mg tablet Take 1 Tab by mouth two (2) times daily (with meals). Qty: 60 Tab, Refills: 11      furosemide (LASIX) 20 mg tablet Take 1 Tab by mouth daily. Qty: 30 Tab, Refills: 11      multivitamin (ONE A DAY) tablet Take 1 Tab by mouth daily. nitroglycerin (NITROSTAT) 0.4 mg SL tablet 1 Tab by SubLINGual route every five (5) minutes as needed for Chest Pain. Qty: 25 Tab, Refills: 5      calcium-vitamin D (OSCAL) 500-125 mg-unit tablet Take 1 Tab by mouth two (2) times a day. Reference discharge instructions as provided by nursing for diet, labs, medications, activity, wound care and any outpatient referrals. Follow-up Appointments   Procedures    FOLLOW UP VISIT Appointment in: Two Weeks     Standing Status:   Standing     Number of Occurrences:   1     Order Specific Question:   Appointment in     Answer:    Two Weeks        Signed:  Angela Coronado MD  10/13/2017  7:25 AM

## 2017-10-13 NOTE — PROGRESS NOTES
Problem: Falls - Risk of  Goal: *Absence of Falls  Document Daniel Fall Risk and appropriate interventions in the flowsheet. Outcome: Progressing Towards Goal  Fall Risk Interventions:  Mobility Interventions: Bed/chair exit alarm, Communicate number of staff needed for ambulation/transfer, Patient to call before getting OOB, Strengthening exercises (ROM-active/passive)           Medication Interventions: Bed/chair exit alarm, Evaluate medications/consider consulting pharmacy, Patient to call before getting OOB, Teach patient to arise slowly     Elimination Interventions: Bed/chair exit alarm, Call light in reach, Patient to call for help with toileting needs, Toileting schedule/hourly rounds                 Problem: Pressure Injury - Risk of  Goal: *Prevention of pressure ulcer  Outcome: Progressing Towards Goal  AEB pt free from new skin breakdown.

## 2017-10-15 ENCOUNTER — PATIENT OUTREACH (OUTPATIENT)
Dept: CASE MANAGEMENT | Age: 77
End: 2017-10-15

## 2017-10-15 NOTE — PROGRESS NOTES
Transition of Care Discharge Follow-up Questionnaire   Date/Time of Call:   10/15/17 5:08p   What was the patient hospitalized for? Carotid artery disease   Does the patient understand his/her diagnosis and/or treatment and what happened during the hospitalization? Patient understands the diagnosis and treatments that occurred. Did the patient receive discharge instructions? Yes   Review any discharge instructions (see notes in [facility]). Ask patient if they understand these. Do they have any questions? Patient understands discharge instructions. Were home services ordered (nursing, PT, OT, ST, etc.)? No   If so, has the first visit occurred? If not, why? (Assist with coordination of services if necessary.) n/a   Was any DME ordered? No   If so, has it been received? If not, why?  (Assist with coordination of arranging DME orders if necessary.) n/a   Complete a review of all medications (new, continued and discontinued meds per the D/C instructions and medication tab in [facility]). Patient and care coordinator reviewed current medications. Were all new prescriptions filled? If not, why?  (Assist with obtainment of medications if necessary.) No. Patient verbalizes that she does not need the medication. Does the patient understand the purpose and dosing instructions for all medications? (If patient has questions, provide explanation and education.) Patient understands the dosing instructions for all medications. Does the patient have any problems in performing ADLs? (If patient is unable to perform ADLs  what is the limiting factor(s)? Do they have a support system that can assist? If no support system is present, discuss possible assistance that they may be able to obtain.) This patient does not verbalize having any problems in performing ADLs. Patient states her children are helping as needed. Does the patient have all follow-up appointments scheduled?       7 day f/up with PCP?    7-14 day f/up with specialist?    If f/up has not been made  what actions has the care coordinator made to accomplish this? Has transportation been arranged? [facility] follow-up should be within 7 days of discharge; all others should have PCP follow-up within 7 days of discharge; follow-ups with other specialists should be within 7-14 days of discharge.) Patient has the following appt(s):  10/27/17 Vascular Surgeon  Patient verbalizes that she will follow-up with vascular surgeon instead of PCP. Any other questions or concerns expressed by the patient? No questions or concerns voiced by patient. She was thankful for call. Schedule next appointment with KATRIN RADER Coordinator or refer to RN Case Manager/  per the workflow guidelines. When is care coordinators next follow-up call scheduled? If referred for CCM  what RN care manager was the referral assigned? CC will follow-up with patient within 30 days. N/A        N/A   SUNITA Call Completed By:   Rock Iker LPN  Care Coordinator       This note will not be viewable in [address]

## 2018-08-07 ENCOUNTER — HOSPITAL ENCOUNTER (OUTPATIENT)
Dept: MAMMOGRAPHY | Age: 78
Discharge: HOME | End: 2018-08-07
Attending: INTERNAL MEDICINE
Payer: MEDICARE

## 2018-08-07 DIAGNOSIS — Z12.39 BREAST CANCER SCREENING: ICD-10-CM

## 2018-08-07 PROCEDURE — 77067 SCR MAMMO BI INCL CAD: CPT

## 2019-05-10 PROBLEM — Z98.890 H/O CAROTID ENDARTERECTOMY: Status: ACTIVE | Noted: 2019-05-10

## 2019-08-27 ENCOUNTER — APPOINTMENT (OUTPATIENT)
Dept: GENERAL RADIOLOGY | Age: 79
End: 2019-08-27
Attending: EMERGENCY MEDICINE
Payer: MEDICARE

## 2019-08-27 ENCOUNTER — HOSPITAL ENCOUNTER (EMERGENCY)
Age: 79
Discharge: HOME | End: 2019-08-27
Attending: EMERGENCY MEDICINE
Payer: MEDICARE

## 2019-08-27 VITALS
BODY MASS INDEX: 28.47 KG/M2 | DIASTOLIC BLOOD PRESSURE: 66 MMHG | WEIGHT: 145 LBS | HEIGHT: 60 IN | SYSTOLIC BLOOD PRESSURE: 98 MMHG | OXYGEN SATURATION: 94 % | TEMPERATURE: 100.1 F | HEART RATE: 84 BPM | RESPIRATION RATE: 20 BRPM

## 2019-08-27 DIAGNOSIS — R07.9 CHEST PAIN, UNSPECIFIED TYPE: ICD-10-CM

## 2019-08-27 DIAGNOSIS — M25.552 LEFT HIP PAIN: Primary | ICD-10-CM

## 2019-08-27 LAB
ANION GAP SERPL CALC-SCNC: 4 MMOL/L (ref 7–16)
ATRIAL RATE: 86 BPM
BASOPHILS # BLD: 0 K/UL (ref 0–0.2)
BASOPHILS NFR BLD: 0 % (ref 0–2)
BUN SERPL-MCNC: 13 MG/DL (ref 8–23)
CALCIUM SERPL-MCNC: 9.1 MG/DL (ref 8.3–10.4)
CALCULATED P AXIS, ECG09: 73 DEGREES
CALCULATED R AXIS, ECG10: 13 DEGREES
CALCULATED T AXIS, ECG11: 52 DEGREES
CHLORIDE SERPL-SCNC: 101 MMOL/L (ref 98–107)
CO2 SERPL-SCNC: 31 MMOL/L (ref 21–32)
CREAT SERPL-MCNC: 1.14 MG/DL (ref 0.6–1)
DIAGNOSIS, 93000: NORMAL
DIFFERENTIAL METHOD BLD: ABNORMAL
EOSINOPHIL # BLD: 0.1 K/UL (ref 0–0.8)
EOSINOPHIL NFR BLD: 1 % (ref 0.5–7.8)
ERYTHROCYTE [DISTWIDTH] IN BLOOD BY AUTOMATED COUNT: 13.5 % (ref 11.9–14.6)
GLUCOSE SERPL-MCNC: 208 MG/DL (ref 65–100)
HCT VFR BLD AUTO: 41.2 % (ref 35.8–46.3)
HGB BLD-MCNC: 12.3 G/DL (ref 11.7–15.4)
IMM GRANULOCYTES # BLD AUTO: 0.1 K/UL (ref 0–0.5)
IMM GRANULOCYTES NFR BLD AUTO: 1 % (ref 0–5)
LYMPHOCYTES # BLD: 0.9 K/UL (ref 0.5–4.6)
LYMPHOCYTES NFR BLD: 9 % (ref 13–44)
MCH RBC QN AUTO: 23.1 PG (ref 26.1–32.9)
MCHC RBC AUTO-ENTMCNC: 29.9 G/DL (ref 31.4–35)
MCV RBC AUTO: 77.3 FL (ref 79.6–97.8)
MONOCYTES # BLD: 0.8 K/UL (ref 0.1–1.3)
MONOCYTES NFR BLD: 8 % (ref 4–12)
NEUTS SEG # BLD: 7.8 K/UL (ref 1.7–8.2)
NEUTS SEG NFR BLD: 81 % (ref 43–78)
NRBC # BLD: 0 K/UL (ref 0–0.2)
P-R INTERVAL, ECG05: 162 MS
PLATELET # BLD AUTO: 183 K/UL (ref 150–450)
PMV BLD AUTO: 11.7 FL (ref 9.4–12.3)
POTASSIUM SERPL-SCNC: 4.2 MMOL/L (ref 3.5–5.1)
Q-T INTERVAL, ECG07: 364 MS
QRS DURATION, ECG06: 110 MS
QTC CALCULATION (BEZET), ECG08: 435 MS
RBC # BLD AUTO: 5.33 M/UL (ref 4.05–5.2)
SODIUM SERPL-SCNC: 136 MMOL/L (ref 136–145)
TROPONIN I BLD-MCNC: 0.01 NG/ML (ref 0.02–0.05)
VENTRICULAR RATE, ECG03: 86 BPM
WBC # BLD AUTO: 9.7 K/UL (ref 4.3–11.1)

## 2019-08-27 PROCEDURE — 84484 ASSAY OF TROPONIN QUANT: CPT

## 2019-08-27 PROCEDURE — 99285 EMERGENCY DEPT VISIT HI MDM: CPT | Performed by: EMERGENCY MEDICINE

## 2019-08-27 PROCEDURE — 74011250637 HC RX REV CODE- 250/637: Performed by: EMERGENCY MEDICINE

## 2019-08-27 PROCEDURE — 93005 ELECTROCARDIOGRAM TRACING: CPT | Performed by: EMERGENCY MEDICINE

## 2019-08-27 PROCEDURE — 80048 BASIC METABOLIC PNL TOTAL CA: CPT

## 2019-08-27 PROCEDURE — 73502 X-RAY EXAM HIP UNI 2-3 VIEWS: CPT

## 2019-08-27 PROCEDURE — 81003 URINALYSIS AUTO W/O SCOPE: CPT | Performed by: EMERGENCY MEDICINE

## 2019-08-27 PROCEDURE — 85025 COMPLETE CBC W/AUTO DIFF WBC: CPT

## 2019-08-27 PROCEDURE — 74011636637 HC RX REV CODE- 636/637: Performed by: EMERGENCY MEDICINE

## 2019-08-27 RX ORDER — IBUPROFEN 800 MG/1
800 TABLET ORAL
Status: COMPLETED | OUTPATIENT
Start: 2019-08-27 | End: 2019-08-27

## 2019-08-27 RX ORDER — TRAMADOL HYDROCHLORIDE 50 MG/1
50-100 TABLET ORAL
Qty: 20 TAB | Refills: 0 | Status: SHIPPED | OUTPATIENT
Start: 2019-08-27 | End: 2019-08-30

## 2019-08-27 RX ORDER — TRAMADOL HYDROCHLORIDE 50 MG/1
100 TABLET ORAL
Status: COMPLETED | OUTPATIENT
Start: 2019-08-27 | End: 2019-08-27

## 2019-08-27 RX ORDER — PREDNISONE 20 MG/1
40 TABLET ORAL DAILY
Qty: 8 TAB | Refills: 0 | Status: SHIPPED | OUTPATIENT
Start: 2019-08-27 | End: 2019-08-31

## 2019-08-27 RX ADMIN — TRAMADOL HYDROCHLORIDE 100 MG: 50 TABLET, FILM COATED ORAL at 11:17

## 2019-08-27 RX ADMIN — IBUPROFEN 800 MG: 800 TABLET, FILM COATED ORAL at 11:17

## 2019-08-27 RX ADMIN — PREDNISONE 60 MG: 10 TABLET ORAL at 11:17

## 2019-08-27 NOTE — ED PROVIDER NOTES
79-year-old female patient presents with left hip pain. Onset Friday of last week, about 4 days ago. Worsening through the weekend. Patient has been having to do extra caretaking of her  who is ill. Doing a lot of lifting and rolling and changing of his garments/bedclothes. Patient has not had any fall or direct impact trauma, and states the pain is worse with bending and moving. No remedies attempted yet. No abdominal pain, no radiation down the leg, no back pain, patient does feel pain around the upper gluteal area as well as the. No weakness or numbness. No Difficulty emptying her bladder though patient does describe urinary frequency. Her son arrived at the end of the interview and relates that apparently his sister told him that the patient was having chest pains sometime on Sunday son and his wife saw the patient Monday and there is no mention made them of chest pain. Patient disavow's having had chest pain Sunday but is amenable to an EKG and a set of cardiac enzymes. Past Medical History:   Diagnosis Date    Arthritis     osteoarthritis - L leg     CAD (coronary artery disease)     stent placement 2008- 2000 MI     Carotid artery stenosis without cerebral infarction 7/7/2016    Asx right carotid stenosis found. Carotid stenting 6/09.  U/S 2015 showing only minor obstruction bilaterally     CHF (congestive heart failure) ([address] 7/7/2016    resolved    CHF (congestive heart failure) ([address] 7/7/2016    Chronic kidney disease     Chronic kidney disease, stage III (moderate) ([address] 7/7/2016    Per Dr. Valeria Hudson Chronic kidney disease, stage III (moderate) ([address] 7/7/2016    Diabetes ([address]     checks QD, normal 100, hyposymptoms at 79    Full dentures     upper & lower    GERD (gastroesophageal reflux disease)     controlled with meds     Hypercholesterolemia     Hypertension     controlled with meds     Menopause     Palpitations 5/28/2013    Paroxysmal SVT (supraventricular tachycardia) ([facility] 7/7/2016    Rapid atrial     Paroxysmal SVT (supraventricular tachycardia) ([facility] 7/7/2016    Pelvic mass 9/14/2009    [facility]        Past Surgical History:   Procedure Laterality Date    CARDIAC SURG PROCEDURE UNLIST      cardiac stent 2000     HX CAROTID ENDARTERECTOMY Right 10/11/2017    VASCULAR SURGERY PROCEDURE UNLIST      carotid artery stent placement (side unknown).           Family History:   Problem Relation Age of Onset    Diabetes Father     Hypertension Father     Heart Disease Father     Diabetes Brother     Heart Disease Brother     No Known Problems Mother     Breast Cancer Sister        Social History     Socioeconomic History    Marital status:      Spouse name: Not on file    Number of children: Not on file    Years of education: Not on file    Highest education level: Not on file   Occupational History    Not on file   Social Needs    Financial resource strain: Not on file    Food insecurity:     Worry: Not on file     Inability: Not on file    Transportation needs:     Medical: Not on file     Non-medical: Not on file   Tobacco Use    Smoking status: Never Smoker    Smokeless tobacco: Never Used   Substance and Sexual Activity    Alcohol use: No    Drug use: Not on file    Sexual activity: Not on file   Lifestyle    Physical activity:     Days per week: Not on file     Minutes per session: Not on file    Stress: Not on file   Relationships    Social connections:     Talks on phone: Not on file     Gets together: Not on file     Attends [employer] service: Not on file     Active member of club or organization: Not on file     Attends meetings of clubs or organizations: Not on file     Relationship status: Not on file    Intimate partner violence:     Fear of current or ex partner: Not on file     Emotionally abused: Not on file     Physically abused: Not on file     Forced sexual activity: Not on file Other Topics Concern    Not on file   Social History Narrative    Not on file         ALLERGIES: Patient has no known allergies. Review of Systems   Constitutional: Negative for chills and fever. HENT: Negative for rhinorrhea and sore throat. Eyes: Negative for discharge and redness. Respiratory: Negative for cough and shortness of breath. Cardiovascular: Positive for chest pain. Negative for palpitations. Gastrointestinal: Negative for abdominal pain, diarrhea, nausea and vomiting. Genitourinary: Positive for frequency. Negative for dysuria. Musculoskeletal: Positive for arthralgias and gait problem. Negative for back pain. Skin: Negative for rash. Neurological: Negative for dizziness, weakness, numbness and headaches. All other systems reviewed and are negative. Vitals:    08/27/19 0945   BP: 137/77   Pulse: 91   Resp: 18   Temp: 100.1 °F (37.8 °C)   SpO2: 94%   Weight: 65.8 kg (145 lb)   Height: 5' (1.524 m)            Physical Exam   Constitutional: She is oriented to person, place, and time. She appears well-developed and well-nourished. No distress. HENT:   Head: Normocephalic and atraumatic. Eyes: Pupils are equal, round, and reactive to light. Conjunctivae are normal. Right eye exhibits no discharge. Left eye exhibits no discharge. No scleral icterus. Neck: Normal range of motion. Neck supple. Cardiovascular: Normal rate, regular rhythm and normal heart sounds. Exam reveals no gallop. No murmur heard. Pulmonary/Chest: Effort normal and breath sounds normal. No respiratory distress. She has no wheezes. She has no rales. Abdominal: Soft. Bowel sounds are normal. There is no tenderness. There is no guarding. Musculoskeletal: Normal range of motion. She exhibits tenderness. She exhibits no edema. Left hip: She exhibits tenderness. Legs:  Negative straight leg raise test   Neurological: She is alert and oriented to person, place, and time.  She exhibits normal muscle tone. cni 2-12 grossly   Skin: Skin is warm and dry. She is not diaphoretic. Psychiatric: She has a normal mood and affect. Her behavior is normal.   Nursing note and vitals reviewed. MDM  Number of Diagnoses or Management Options  Diagnosis management comments: Medical decision making note:  Several days of insidious onset left hip pain without discrete trauma, patient does describe some degree of overuse syndrome. Hip x-rays good, suspect arthritis/bursitis, will treat with lower than usual dose of prednisone due to her diabetes, anti-inflammatories, mild pain medicines. Patient instructed to ice rather than heat,    This concludes the \"medical decision making note\" part of this emergency department visit note.            Procedures

## 2019-08-27 NOTE — ED NOTES
I have reviewed discharge instructions with the patient. The patient verbalized understanding. Patient left ED via Discharge Method: ambulatory to Home with self. Opportunity for questions and clarification provided. Patient given 2 scripts. To continue your aftercare when you leave the hospital, you may receive an automated call from our care team to check in on how you are doing. This is a free service and part of our promise to provide the best care and service to meet your aftercare needs.  If you have questions, or wish to unsubscribe from this service please call [phone]. Thank you for Choosing our [address] [place] [address] Emergency Department.

## 2019-08-27 NOTE — DISCHARGE INSTRUCTIONS
Patient Education        Hip Pain: Care Instructions  Your Care Instructions    Hip pain may be caused by many things, including overuse, a fall, or a twisting movement. Another cause of hip pain is arthritis. Your pain may increase when you stand up, walk, or squat. The pain may come and go or may be constant. Home treatment can help relieve hip pain, swelling, and stiffness. If your pain is ongoing, you may need more tests and treatment. Follow-up care is a key part of your treatment and safety. Be sure to make and go to all appointments, and call your doctor if you are having problems. It's also a good idea to know your test results and keep a list of the medicines you take. How can you care for yourself at home? · Take pain medicines exactly as directed. ? If the doctor gave you a prescription medicine for pain, take it as prescribed. ? If you are not taking a prescription pain medicine, ask your doctor if you can take an over-the-counter medicine. · Rest and protect your hip. Take a break from any activity, including standing or walking, that may cause pain. · Put ice or a cold pack against your hip for 10 to 20 minutes at a time. Try to do this every 1 to 2 hours for the next 3 days (when you are awake) or until the swelling goes down. Put a thin cloth between the ice and your skin. · Sleep on your healthy side with a pillow between your knees, or sleep on your back with pillows under your knees. · If there is no swelling, you can put moist heat, a heating pad, or a warm cloth on your hip. Do gentle stretching exercises to help keep your hip flexible. · Learn how to prevent falls. Have your vision and hearing checked regularly. Wear slippers or shoes with a nonskid sole. · Stay at a healthy weight. · Wear comfortable shoes. When should you call for help? Call 911 anytime you think you may need emergency care.  For example, call if:    · You have sudden chest pain and shortness of breath, or you cough up blood.     · You are not able to stand or walk or bear weight.     · Your buttocks, legs, or feet feel numb or tingly.     · Your leg or foot is cool or pale or changes color.     · You have severe pain.    Call your doctor now or seek immediate medical care if:    · You have signs of infection, such as:  ? Increased pain, swelling, warmth, or redness in the hip area. ? Red streaks leading from the hip area. ? Pus draining from the hip area. ? A fever.     · You have signs of a blood clot, such as:  ? Pain in your calf, back of the knee, thigh, or groin. ? Redness and swelling in your leg or groin.     · You are not able to bend, straighten, or move your leg normally.     · You have trouble urinating or having bowel movements.    Watch closely for changes in your health, and be sure to contact your doctor if:    · You do not get better as expected. Where can you learn more? Go to http://laurent-otto.info/. Enter P417 in the search box to learn more about \"Hip Pain: Care Instructions. \"  Current as of: September 23, 2018  Content Version: 12.1  © 2570-2725 [employer]. Care instructions adapted under license by [employer] (which disclaims liability or warranty for this information). If you have questions about a medical condition or this instruction, always ask your healthcare professional. [place] [address] any warranty or liability for your use of this information.

## 2019-09-30 PROBLEM — I48.0 PAF (PAROXYSMAL ATRIAL FIBRILLATION) (HCC): Status: ACTIVE | Noted: 2019-09-30

## 2020-01-15 PROBLEM — R00.1 BRADYCARDIA: Status: ACTIVE | Noted: 2020-01-15

## 2020-03-12 ENCOUNTER — HOSPITAL ENCOUNTER (OUTPATIENT)
Dept: GENERAL RADIOLOGY | Age: 80
Discharge: HOME | End: 2020-03-12

## 2020-03-12 ENCOUNTER — HOSPITAL ENCOUNTER (OUTPATIENT)
Dept: LAB | Age: 80
Discharge: HOME | End: 2020-03-12
Payer: MEDICARE

## 2020-03-12 DIAGNOSIS — I48.0 PAF (PAROXYSMAL ATRIAL FIBRILLATION) (HCC): ICD-10-CM

## 2020-03-12 LAB
ALBUMIN SERPL-MCNC: 3.1 G/DL (ref 3.2–4.6)
ALBUMIN/GLOB SERPL: 0.8 {RATIO} (ref 1.2–3.5)
ALP SERPL-CCNC: 68 U/L (ref 50–136)
ALT SERPL-CCNC: 28 U/L (ref 12–65)
AST SERPL-CCNC: 36 U/L (ref 15–37)
BILIRUB DIRECT SERPL-MCNC: 0.2 MG/DL
BILIRUB SERPL-MCNC: 0.6 MG/DL (ref 0.2–1.1)
GLOBULIN SER CALC-MCNC: 3.8 G/DL (ref 2.3–3.5)
PROT SERPL-MCNC: 6.9 G/DL (ref 6.3–8.2)
TSH SERPL DL<=0.005 MIU/L-ACNC: 48.8 UIU/ML (ref 0.36–3.74)

## 2020-03-12 PROCEDURE — 84443 ASSAY THYROID STIM HORMONE: CPT

## 2020-03-12 PROCEDURE — 80076 HEPATIC FUNCTION PANEL: CPT

## 2020-03-12 PROCEDURE — 36415 COLL VENOUS BLD VENIPUNCTURE: CPT

## 2020-12-21 NOTE — PROGRESS NOTES
Bedside report to Harjit Landaverde RN. Neuro intact. 2nd episode of unprovoked PE   Submassive by ECHO and elevated trop/proBNP (right heart strain present)  PESI score 46, Class I, very low risk.

## 2021-03-02 ENCOUNTER — TRANSCRIBE ORDER (OUTPATIENT)
Dept: SCHEDULING | Age: 81
End: 2021-03-02

## 2021-03-02 DIAGNOSIS — Z12.31 VISIT FOR SCREENING MAMMOGRAM: Primary | ICD-10-CM

## 2021-03-17 ENCOUNTER — HOSPITAL ENCOUNTER (OUTPATIENT)
Dept: MAMMOGRAPHY | Age: 81
Discharge: HOME | End: 2021-03-17
Attending: INTERNAL MEDICINE
Payer: MEDICARE

## 2021-03-17 DIAGNOSIS — Z12.31 VISIT FOR SCREENING MAMMOGRAM: ICD-10-CM

## 2021-03-17 PROCEDURE — 77067 SCR MAMMO BI INCL CAD: CPT

## 2021-08-30 PROBLEM — M17.0 PRIMARY OSTEOARTHRITIS OF BOTH KNEES: Status: ACTIVE | Noted: 2021-08-30

## 2021-10-27 ENCOUNTER — HOSPITAL ENCOUNTER (OUTPATIENT)
Dept: GENERAL RADIOLOGY | Age: 81
Discharge: HOME | End: 2021-10-27
Attending: INTERNAL MEDICINE
Payer: MEDICARE

## 2021-10-27 DIAGNOSIS — M25.552 LEFT HIP PAIN: ICD-10-CM

## 2021-10-27 DIAGNOSIS — M25.562 LEFT KNEE PAIN: ICD-10-CM

## 2021-10-27 PROCEDURE — 73562 X-RAY EXAM OF KNEE 3: CPT

## 2021-10-27 PROCEDURE — 73502 X-RAY EXAM HIP UNI 2-3 VIEWS: CPT

## 2022-03-18 PROBLEM — I77.9 CAROTID ARTERY DISEASE (HCC): Status: ACTIVE | Noted: 2017-10-11

## 2022-03-19 PROBLEM — Z98.890 H/O CAROTID ENDARTERECTOMY: Status: ACTIVE | Noted: 2019-05-10

## 2022-03-19 PROBLEM — M17.0 PRIMARY OSTEOARTHRITIS OF BOTH KNEES: Status: ACTIVE | Noted: 2021-08-30

## 2022-03-19 PROBLEM — I48.0 PAF (PAROXYSMAL ATRIAL FIBRILLATION) (HCC): Status: ACTIVE | Noted: 2019-09-30

## 2022-03-20 PROBLEM — R00.1 BRADYCARDIA: Status: ACTIVE | Noted: 2020-01-15

## 2022-06-09 RX ORDER — APIXABAN 2.5 MG/1
TABLET, FILM COATED ORAL
Qty: 180 TABLET | Refills: 3 | Status: SHIPPED | OUTPATIENT
Start: 2022-06-09

## 2022-06-09 NOTE — TELEPHONE ENCOUNTER
Requested Prescriptions     Signed Prescriptions Disp Refills    ELIQUIS 2.5 MG TABS tablet 180 tablet 3     Sig: Take 1 tablet by mouth twice daily     Authorizing Provider: Tee Rios     Ordering User: Ozzie Crenshaw

## 2022-07-28 ENCOUNTER — NURSE ONLY (OUTPATIENT)
Dept: CARDIOLOGY CLINIC | Age: 82
End: 2022-07-28
Payer: COMMERCIAL

## 2022-07-28 ENCOUNTER — TELEPHONE (OUTPATIENT)
Dept: CARDIOLOGY CLINIC | Age: 82
End: 2022-07-28

## 2022-07-28 VITALS — DIASTOLIC BLOOD PRESSURE: 72 MMHG | SYSTOLIC BLOOD PRESSURE: 124 MMHG | HEART RATE: 69 BPM

## 2022-07-28 DIAGNOSIS — I48.0 PAF (PAROXYSMAL ATRIAL FIBRILLATION) (HCC): Primary | ICD-10-CM

## 2022-07-28 PROCEDURE — 93000 ELECTROCARDIOGRAM COMPLETE: CPT | Performed by: INTERNAL MEDICINE

## 2022-08-09 LAB
AVERAGE GLUCOSE: NORMAL
HBA1C MFR BLD: 6.9 %

## 2022-08-10 ASSESSMENT — ENCOUNTER SYMPTOMS
PHOTOPHOBIA: 0
DIARRHEA: 0
CONSTIPATION: 0
ABDOMINAL PAIN: 0
SORE THROAT: 0
SHORTNESS OF BREATH: 0
ABDOMINAL DISTENTION: 0
COUGH: 0

## 2022-08-10 NOTE — PROGRESS NOTES
[facility] CARDIOLOGY  [address], [address]  PHONE: [phone]        NAME:  Tonya Pierce  : 1940  MRN: [id number]     PCP:  Scott Delgado MD      SUBJECTIVE:   Tonya Pierce is a 79 y.o. female seen for a follow up visit regarding the following:     Chief Complaint   Patient presents with    Coronary Artery Disease       HPI:  Doing well since last visit without interval angina, CHF, palpitations, edema, presyncope or syncope but presented last year with worsening weakness, fatigue, and near syncope with  low BP and low HR. We stopped coreg and decreased amio and losartan and felt better immediately. BP and HR higher and all symptoms noted above completely resolved. Remains in sinus today with stable BP and no postural symptoms whatsoever, however she reports intermittent \"irregular heartbeat\" on her blood pressure monitor without any clinical symptoms and without tachycardia. Stopped amiodarone in the past but we will perform a Holter monitor soon. Vitals well controlled and tolerating meds well. Echo last 2021 with low normal EF, mild AV sclerosis and MR, otherwise  benign. Doing well s/p right CEA, surveillance duplex last year with mild bilateral ICA disease at most.  Asymptomatic from carotid standpoint. Recent labs look good (see scanned sheets). Past Medical History, Past Surgical History, Family history, Social History, and Medications were all reviewed with the patient today and updated as necessary. Current Outpatient Medications   Medication Sig Dispense Refill    ELIQUIS 2.5 MG TABS tablet Take 1 tablet by mouth twice daily 180 tablet 3    atorvastatin (LIPITOR) 40 MG tablet Take 20 mg by mouth in the morning.       docusate (COLACE, DULCOLAX) 100 MG CAPS Take 100 mg by mouth daily      furosemide (LASIX) 20 MG tablet Take 20 mg by mouth daily      insulin detemir (LEVEMIR) 100 UNIT/ML injection vial Inject 15 Units into the skin      losartan (COZAAR) 100 MG tablet Take 100 mg by mouth daily      nitroGLYCERIN (NITROSTAT) 0.4 MG SL tablet Place 0.4 mg under the tongue      omeprazole (PRILOSEC) 20 MG delayed release capsule Take 20 mg by mouth daily       No current facility-administered medications for this visit. Not on File    Patient Active Problem List    Diagnosis Date Noted    Primary osteoarthritis of both knees 08/30/2021    Bradycardia 01/15/2020    PAF (paroxysmal atrial fibrillation) ([address] 09/30/2019    H/O carotid endarterectomy 05/10/2019    CAD (coronary artery disease)      Overview Note:     stent placement 2008- 2000 MI         GERD (gastroesophageal reflux disease)      Overview Note:     controlled with meds         Carotid artery disease ([address] 10/11/2017    S/P coronary artery stent placement 07/20/2016    CHF (congestive heart failure) ([facility] 07/07/2016     Overview Note:     resolved        Carotid artery stenosis without cerebral infarction 07/07/2016     Overview Note:     Asx right carotid stenosis found. Carotid stenting 6/09.  U/S 2015 showing   only minor obstruction bilaterally         Paroxysmal SVT (supraventricular tachycardia) ([address] 07/07/2016     Overview Note:     Rapid atrial : Seven beat run seen on event monitor in June 2013        Chronic kidney disease, stage III (moderate) ([address] 07/07/2016     Overview Note:     Per Dr. Jaja Dye        Ventricular tachycardia [facility])     Full dentures      Overview Note:     upper & lower        Arthritis      Overview Note:     osteoarthritis - L leg         Palpitations 05/28/2013    HTN (hypertension), malignant 05/09/2013    Diabetes mellitus type 2, controlled ([address] 05/09/2013    Dyslipidemia 05/09/2013    Coronary atherosclerosis of native coronary artery 05/09/2013    Chronic systolic heart failure ([address] 05/09/2013    Shortness of breath 05/09/2013    Pelvic mass 09/14/2009        Past Surgical History:   Procedure Laterality Date    CAROTID ENDARTERECTOMY Right 10/11/2017    [place] CARDIAC SURG PROCEDURE UNLIST      cardiac stent 2000     VASCULAR SURGERY      carotid artery stent placement (side unknown). Family History   Problem Relation Age of Onset    No Known Problems Mother     Diabetes Father     Diabetes Brother     Heart Disease Father     Hypertension Father     Heart Disease Brother     Breast Cancer Sister         Social History     Tobacco Use    Smoking status: Never    Smokeless tobacco: Never   Substance Use Topics    Alcohol use: No       ROS:    Review of Systems   Constitutional:  Negative for appetite change, chills, diaphoresis and fatigue. HENT:  Negative for congestion, mouth sores, nosebleeds, sore throat and tinnitus. Eyes:  Negative for photophobia and visual disturbance. Respiratory:  Negative for cough and shortness of breath. Cardiovascular:  Negative for chest pain, palpitations and leg swelling. Gastrointestinal:  Negative for abdominal distention, abdominal pain, constipation and diarrhea. Endocrine: Negative for cold intolerance, heat intolerance, polydipsia and polyuria. Genitourinary:  Negative for dysuria and hematuria. Musculoskeletal:  Negative for arthralgias, joint swelling and myalgias. Skin:  Negative for rash. Allergic/Immunologic: Negative for environmental allergies and food allergies. Neurological:  Negative for dizziness, seizures, syncope and light-headedness. Hematological:  Negative for adenopathy. Does not bruise/bleed easily. Psychiatric/Behavioral:  Negative for agitation, behavioral problems, dysphoric mood and hallucinations. The patient is not nervous/anxious.        PHYSICAL EXAM:     Vitals:    08/11/22 0901   BP: (!) 136/58   Pulse: 81   Weight: 153 lb (69.4 kg)   Height: 4' 11\" (1.499 m)      Wt Readings from Last 3 Encounters:   08/11/22 153 lb (69.4 kg)   02/17/22 151 lb (68.5 kg)   08/30/21 157 lb (71.2 kg)      BP Readings from Last 3 Encounters:   08/11/22 (!) 136/58   07/28/22 124/72   02/17/22 120/70        Physical Exam  Constitutional:       Appearance: Normal appearance. She is normal weight. HENT:      Head: Normocephalic and atraumatic. Nose: Nose normal.      Mouth/Throat:      Mouth: Mucous membranes are moist.      Pharynx: Oropharynx is clear. Eyes:      Extraocular Movements: Extraocular movements intact. Pupils: Pupils are equal, round, and reactive to light. Neck:      Vascular: No carotid bruit or JVD. Cardiovascular:      Rate and Rhythm: Normal rate and regular rhythm. Heart sounds: No murmur heard. No friction rub. No gallop. Pulmonary:      Effort: Pulmonary effort is normal.      Breath sounds: Normal breath sounds. No wheezing or rhonchi. Abdominal:      General: Abdomen is flat. Bowel sounds are normal. There is no distension. Palpations: Abdomen is soft. Tenderness: no abdominal tenderness   Musculoskeletal:         General: No swelling. Normal range of motion. Cervical back: Normal range of motion and neck supple. No tenderness. Skin:     General: Skin is warm and dry. Neurological:      General: No focal deficit present. Mental Status: She is alert and oriented to person, place, and time. Mental status is at baseline. Psychiatric:         Mood and Affect: Mood normal.         Behavior: Behavior normal.        Medical problems and test results were reviewed with the patient today.        No results found for: CHOL  No results found for: TRIG  No results found for: HDL  No results found for: LDLCHOLESTEROL, LDLCALC  No results found for: LABVLDL, VLDL  No results found for: [facility]     Lab Results   Component Value Date/Time     08/27/2019 11:11 AM    K 4.2 08/27/2019 11:11 AM     08/27/2019 11:11 AM    CO2 31 08/27/2019 11:11 AM    BUN 13 08/27/2019 11:11 AM    CREATININE 1.14 08/27/2019 11:11 AM    GLUCOSE 208 08/27/2019 11:11 AM    CALCIUM 9.1 08/27/2019 11:11 AM         No results for input(s): WBC, HGB, HCT, MCV, PLT in the last 720 hours. No results found for: LABA1C  No results found for: EAG     No results found for: BNP     Lab Results   Component Value Date    TSH 48.800 (H) 03/12/2020     No results found for this visit on 08/11/22. ECG today:  Sinus  Rhythm 81bpm  -Right bundle branch block.    -Anteroseptal infarct -age undetermined. -  Nonspecific T-abnormality. ASSESSMENT and PLAN     Diagnoses and all orders for this visit:      Bradycardia-severe- resolved, continue current meds as noted above. Paroxysmal SVT (supraventricular tachycardia) ([facility] [address]- resolved, see above, stopped beta blockers due to symptomatic keyur/hypotension -stop amiodarone today per her request.  Resume as needed if tachypalpitations recur. .. See above. With recent complaints of irregular heartbeat when checking blood pressure (although no symptoms) we will check a 5-day Holter to exclude recurrent supraventricular tachyarrhythmias. Having labs with Dr. Jaja Dye soon as well. Replete electrolytes as needed. Carotid artery stenosis without cerebral infarction, bilateral- stable by duplex last visit, s/p right CEA, surveillance duplex again in 3 months to 1 year or so      Coronary artery disease involving native coronary artery of native heart without angina pectoris- stable without angina, CHF, or palpitations. continue meds      HTN (hypertension), malignant- overmedicated recently with symptoms- doing great now after med decrease. Echo in 1 -2 years.        Gastroesophageal reflux disease without esophagitis- stable, controlled, continue meds      Diabetes mellitus type 2, controlled ([facility] [address] [facility]- stable, controlled, continue meds      Chronic kidney disease, stage III (moderate)- stable, controlled, continue meds      Dyslipidemia- stable, controlled, continue meds- per Dr. Jaja Dye- see above      S/P coronary artery stent placement- stable, controlled, continue meds      Osteoarthritis-refer to [place] [facility] for evaluation and therapy as needed. Return in about 6 months (around 2/11/2023).          Antoinette Hodges MD  8/11/2022  9:19 AM

## 2022-08-11 ENCOUNTER — OFFICE VISIT (OUTPATIENT)
Dept: CARDIOLOGY CLINIC | Age: 82
End: 2022-08-11
Payer: COMMERCIAL

## 2022-08-11 VITALS
SYSTOLIC BLOOD PRESSURE: 136 MMHG | WEIGHT: 153 LBS | DIASTOLIC BLOOD PRESSURE: 58 MMHG | HEART RATE: 81 BPM | HEIGHT: 59 IN | BODY MASS INDEX: 30.84 KG/M2

## 2022-08-11 DIAGNOSIS — Z95.5 S/P CORONARY ARTERY STENT PLACEMENT: ICD-10-CM

## 2022-08-11 DIAGNOSIS — I65.23 BILATERAL CAROTID ARTERY STENOSIS WITHOUT CEREBRAL INFARCTION: ICD-10-CM

## 2022-08-11 DIAGNOSIS — I25.10 CORONARY ARTERY DISEASE INVOLVING NATIVE CORONARY ARTERY OF NATIVE HEART WITHOUT ANGINA PECTORIS: Primary | ICD-10-CM

## 2022-08-11 DIAGNOSIS — I50.22 CHRONIC SYSTOLIC HEART FAILURE (HCC): ICD-10-CM

## 2022-08-11 DIAGNOSIS — R00.2 PALPITATION: ICD-10-CM

## 2022-08-11 DIAGNOSIS — I10 HTN (HYPERTENSION), MALIGNANT: ICD-10-CM

## 2022-08-11 DIAGNOSIS — E78.5 DYSLIPIDEMIA: ICD-10-CM

## 2022-08-11 DIAGNOSIS — R00.1 BRADYCARDIA: ICD-10-CM

## 2022-08-11 PROCEDURE — 99214 OFFICE O/P EST MOD 30 MIN: CPT | Performed by: INTERNAL MEDICINE

## 2022-08-11 PROCEDURE — 1123F ACP DISCUSS/DSCN MKR DOCD: CPT | Performed by: INTERNAL MEDICINE

## 2022-08-11 PROCEDURE — 93000 ELECTROCARDIOGRAM COMPLETE: CPT | Performed by: INTERNAL MEDICINE

## 2023-01-09 ENCOUNTER — HOSPITAL ENCOUNTER (OUTPATIENT)
Dept: MAMMOGRAPHY | Age: 83
Discharge: HOME | End: 2023-01-12
Payer: MEDICARE

## 2023-01-09 DIAGNOSIS — Z12.31 SCREENING MAMMOGRAM FOR HIGH-RISK PATIENT: ICD-10-CM

## 2023-01-09 PROCEDURE — 77067 SCR MAMMO BI INCL CAD: CPT

## 2023-02-14 ENCOUNTER — OFFICE VISIT (OUTPATIENT)
Dept: CARDIOLOGY CLINIC | Age: 83
End: 2023-02-14

## 2023-02-14 VITALS
HEIGHT: 59 IN | DIASTOLIC BLOOD PRESSURE: 50 MMHG | HEART RATE: 73 BPM | SYSTOLIC BLOOD PRESSURE: 94 MMHG | BODY MASS INDEX: 30.04 KG/M2 | WEIGHT: 149 LBS

## 2023-02-14 DIAGNOSIS — I48.0 PAF (PAROXYSMAL ATRIAL FIBRILLATION) (HCC): ICD-10-CM

## 2023-02-14 DIAGNOSIS — I25.10 CORONARY ARTERY DISEASE INVOLVING NATIVE CORONARY ARTERY OF NATIVE HEART WITHOUT ANGINA PECTORIS: ICD-10-CM

## 2023-02-14 DIAGNOSIS — Z95.5 S/P CORONARY ARTERY STENT PLACEMENT: ICD-10-CM

## 2023-02-14 DIAGNOSIS — I65.23 BILATERAL CAROTID ARTERY STENOSIS WITHOUT CEREBRAL INFARCTION: ICD-10-CM

## 2023-02-14 DIAGNOSIS — R00.1 BRADYCARDIA: ICD-10-CM

## 2023-02-14 DIAGNOSIS — I50.22 CHRONIC SYSTOLIC HEART FAILURE (HCC): Primary | ICD-10-CM

## 2023-02-14 DIAGNOSIS — E78.5 DYSLIPIDEMIA: ICD-10-CM

## 2023-02-14 RX ORDER — AMLODIPINE BESYLATE 5 MG/1
5 TABLET ORAL DAILY
COMMUNITY
End: 2023-02-14

## 2023-02-14 RX ORDER — PHENOL 1.4 %
1 AEROSOL, SPRAY (ML) MUCOUS MEMBRANE DAILY
COMMUNITY

## 2023-02-14 RX ORDER — AMLODIPINE BESYLATE 2.5 MG/1
2.5 TABLET ORAL DAILY
Qty: 90 TABLET | Refills: 3 | Status: SHIPPED | OUTPATIENT
Start: 2023-02-14

## 2023-02-14 ASSESSMENT — ENCOUNTER SYMPTOMS
ABDOMINAL DISTENTION: 0
DIARRHEA: 0
ABDOMINAL PAIN: 0
CONSTIPATION: 0
COUGH: 0
SHORTNESS OF BREATH: 0
SORE THROAT: 0
PHOTOPHOBIA: 0

## 2023-02-14 NOTE — PROGRESS NOTES
[facility] CARDIOLOGY  [address], [address]  PHONE: [phone]        NAME:  Crystal Ramirez  : 1940  MRN: [id number]     PCP:  Yajaira Al MD      SUBJECTIVE:   Crystal Ramirez is a 79 y.o. female seen for a follow up visit regarding the following:     Chief Complaint   Patient presents with    Atrial Fibrillation       HPI:  Doing well since last visit without interval angina, CHF, palpitations, edema, presyncope or syncope but presented last year with worsening weakness, fatigue, and near syncope with  low BP and low HR. We stopped coreg and decreased amio and losartan and felt better immediately. BP and HR higher and all symptoms noted above completely resolved. Remains in sinus today with stable BP and no postural symptoms whatsoever, however she reports intermittent \"irregular heartbeat\" on her blood pressure monitor without any clinical symptoms and without tachycardia. Stopped amiodarone in the past but we will perform a Holter monitor soon. Vitals well controlled and tolerating meds well. Echo last 2021 with low normal EF, mild AV sclerosis and MR, otherwise  benign. Doing well s/p right CEA, surveillance duplex last year with mild bilateral ICA disease at most.  Asymptomatic from carotid standpoint. Recent labs look good (see scanned sheets). Past Medical History, Past Surgical History, Family history, Social History, and Medications were all reviewed with the patient today and updated as necessary.      Current Outpatient Medications   Medication Sig Dispense Refill    amLODIPine (NORVASC) 5 MG tablet Take 5 mg by mouth daily      calcium carbonate 600 MG TABS tablet Take 1 tablet by mouth daily      ELIQUIS 2.5 MG TABS tablet Take 1 tablet by mouth twice daily 180 tablet 3    atorvastatin (LIPITOR) 40 MG tablet Take 40 mg by mouth daily      docusate (COLACE, DULCOLAX) 100 MG CAPS Take 100 mg by mouth daily furosemide (LASIX) 20 MG tablet Take 20 mg by mouth daily      insulin detemir (LEVEMIR) 100 UNIT/ML injection vial Inject 15 Units into the skin      losartan (COZAAR) 100 MG tablet Take 100 mg by mouth daily      nitroGLYCERIN (NITROSTAT) 0.4 MG SL tablet Place 0.4 mg under the tongue      omeprazole (PRILOSEC) 20 MG delayed release capsule Take 20 mg by mouth daily       No current facility-administered medications for this visit. No Known Allergies    Patient Active Problem List    Diagnosis Date Noted    Primary osteoarthritis of both knees 08/30/2021     Priority: Low    Bradycardia 01/15/2020     Priority: Low    PAF (paroxysmal atrial fibrillation) ([facility] 09/30/2019     Priority: Low    H/O carotid endarterectomy 05/10/2019     Priority: Low    CAD (coronary artery disease)      Priority: Low     Overview Note:     stent placement 2008- 2000 MI         GERD (gastroesophageal reflux disease)      Priority: Low     Overview Note:     controlled with meds         Carotid artery disease ([facility] 10/11/2017     Priority: Low    S/P coronary artery stent placement 07/20/2016     Priority: Low    CHF (congestive heart failure) ([facility] 07/07/2016     Priority: Low     Overview Note:     resolved        Carotid artery stenosis without cerebral infarction 07/07/2016     Priority: Low     Overview Note:     Asx right carotid stenosis found. Carotid stenting 6/09.  U/S 2015 showing   only minor obstruction bilaterally         Paroxysmal SVT (supraventricular tachycardia) ([facility] 07/07/2016     Priority: Low     Overview Note:     Rapid atrial : Seven beat run seen on event monitor in June 2013        Chronic kidney disease, stage III (moderate) ([facility] 07/07/2016     Priority: Low     Overview Note:     Per Dr. Jaja Dye        Ventricular tachycardia      Priority: Low    Full dentures      Priority: Low     Overview Note:     upper & lower        Arthritis      Priority: Low     Overview Note:     osteoarthritis - L leg Palpitations 05/28/2013     Priority: Low    HTN (hypertension), malignant 05/09/2013     Priority: Low    Diabetes mellitus type 2, controlled ([facility] 05/09/2013     Priority: Low    Dyslipidemia 05/09/2013     Priority: Low    Coronary atherosclerosis of native coronary artery 05/09/2013     Priority: Low    Chronic systolic heart failure ([facility] 05/09/2013     Priority: Low    Shortness of breath 05/09/2013     Priority: Low    Pelvic mass 09/14/2009     Priority: Low        Past Surgical History:   Procedure Laterality Date    CAROTID ENDARTERECTOMY Right 10/11/2017    OR UNLISTED PROCEDURE CARDIAC SURGERY      cardiac stent 2000     VASCULAR SURGERY      carotid artery stent placement (side unknown). Family History   Problem Relation Age of Onset    No Known Problems Mother     Diabetes Father     Heart Disease Father     Hypertension Father     Diabetes Brother     Heart Disease Brother     Breast Cancer Neg Hx         Social History     Tobacco Use    Smoking status: Never    Smokeless tobacco: Never   Substance Use Topics    Alcohol use: No       ROS:    Review of Systems   Constitutional:  Negative for appetite change, chills, diaphoresis and fatigue. HENT:  Negative for congestion, mouth sores, nosebleeds, sore throat and tinnitus. Eyes:  Negative for photophobia and visual disturbance. Respiratory:  Negative for cough and shortness of breath. Cardiovascular:  Negative for chest pain, palpitations and leg swelling. Gastrointestinal:  Negative for abdominal distention, abdominal pain, constipation and diarrhea. Endocrine: Negative for cold intolerance, heat intolerance, polydipsia and polyuria. Genitourinary:  Negative for dysuria and hematuria. Musculoskeletal:  Negative for arthralgias, joint swelling and myalgias. Skin:  Negative for rash. Allergic/Immunologic: Negative for environmental allergies and food allergies.    Neurological:  Negative for dizziness, seizures, syncope and light-headedness. Hematological:  Negative for adenopathy. Does not bruise/bleed easily. Psychiatric/Behavioral:  Negative for agitation, behavioral problems, dysphoric mood and hallucinations. The patient is not nervous/anxious. PHYSICAL EXAM:     Vitals:    02/14/23 0924   BP: (!) 94/50   Pulse: 73   Weight: 149 lb (67.6 kg)   Height: 4' 11\" (1.499 m)      Wt Readings from Last 3 Encounters:   02/14/23 149 lb (67.6 kg)   08/11/22 153 lb (69.4 kg)   02/17/22 151 lb (68.5 kg)      BP Readings from Last 3 Encounters:   02/14/23 (!) 94/50   08/11/22 (!) 136/58   07/28/22 124/72        Physical Exam  Constitutional:       Appearance: Normal appearance. She is normal weight. HENT:      Head: Normocephalic and atraumatic. Nose: Nose normal.      Mouth/Throat:      Mouth: Mucous membranes are moist.      Pharynx: Oropharynx is clear. Eyes:      Extraocular Movements: Extraocular movements intact. Pupils: Pupils are equal, round, and reactive to light. Neck:      Vascular: No carotid bruit or JVD. Cardiovascular:      Rate and Rhythm: Normal rate and regular rhythm. Heart sounds: No murmur heard. No friction rub. No gallop. Pulmonary:      Effort: Pulmonary effort is normal.      Breath sounds: Normal breath sounds. No wheezing or rhonchi. Abdominal:      General: Abdomen is flat. Bowel sounds are normal. There is no distension. Palpations: Abdomen is soft. Tenderness: There is no abdominal tenderness. Musculoskeletal:         General: No swelling. Normal range of motion. Cervical back: Normal range of motion and neck supple. No tenderness. Skin:     General: Skin is warm and dry. Neurological:      General: No focal deficit present. Mental Status: She is alert and oriented to person, place, and time. Mental status is at baseline.    Psychiatric:         Mood and Affect: Mood normal.         Behavior: Behavior normal.        Medical problems and test results were reviewed with the patient today. No results found for: CHOL  No results found for: TRIG  No results found for: HDL  No results found for: LDLCHOLESTEROL, LDLCALC  No results found for: LABVLDL, VLDL  No results found for: [facility]     Lab Results   Component Value Date/Time     08/27/2019 11:11 AM    K 4.2 08/27/2019 11:11 AM     08/27/2019 11:11 AM    CO2 31 08/27/2019 11:11 AM    BUN 13 08/27/2019 11:11 AM    CREATININE 1.14 08/27/2019 11:11 AM    GLUCOSE 208 08/27/2019 11:11 AM    CALCIUM 9.1 08/27/2019 11:11 AM         No results for input(s): WBC, HGB, HCT, MCV, PLT in the last 720 hours. Lab Results   Component Value Date    LABA1C 6.9 08/09/2022     No results found for: EAG     No results found for: BNP     Lab Results   Component Value Date    TSH 48.800 (H) 03/12/2020     ECG today:  Sinus  Rhythm  - occasional ectopic ventricular beat   73 bpm   Normal axis   Right bundle branch block    Nonspecific T-abnormality. ASSESSMENT and PLAN     Diagnoses and all orders for this visit:      Bradycardia-severe- resolved, continue current meds as noted above. Call with any symptoms in the future. Paroxysmal SVT (supraventricular tachycardia) ([address]- resolved, see above, stopped beta blockers due to symptomatic keyur/hypotension -stopped amiodarone in the past with no recurrence. Resume as needed if tachypalpitations recur. .. The Holter monitor from last year       Carotid artery stenosis without cerebral infarction, bilateral- stable by duplex last visit, s/p right CEA, check surveillance duplex again in 6 months      Coronary artery disease involving native coronary artery of native heart without angina pectoris- stable without angina, CHF, or palpitations. continue meds      HTN (hypertension), malignant- overmedicated recently with symptoms-presents today with hypotension verified by me, systolic 95 mmHg but clinically denies any symptoms.   Emphasized hydrating with at least 60 to 80 ounces of fluid daily, decrease amlodipine to 2.5 mg daily today. Call with any postural symptoms at which time we will stop amlodipine completely and reassess. Gastroesophageal reflux disease without esophagitis- stable, controlled, continue meds      Diabetes mellitus type 2, controlled ([address]- stable, controlled, continue meds      Chronic kidney disease, stage III (moderate)- stable, controlled, continue meds      Dyslipidemia- stable, controlled, continue meds- per Dr. Irina Koenig- see above      S/P coronary artery stent placement- stable, controlled, continue meds      Osteoarthritis-refer to [facility] orthopedics for evaluation and therapy as needed. Return in about 6 months (around 8/14/2023).          Delmi Epps MD  2/14/2023  9:44 AM

## 2023-08-26 ASSESSMENT — ENCOUNTER SYMPTOMS
SORE THROAT: 0
CONSTIPATION: 0
COUGH: 0
ABDOMINAL DISTENTION: 0
PHOTOPHOBIA: 0
DIARRHEA: 0
ABDOMINAL PAIN: 0
SHORTNESS OF BREATH: 0

## 2023-08-26 NOTE — PROGRESS NOTES
Addended by: GT SAMANIEGO on: 4/12/2022 08:53 AM     Modules accepted: Orders     infarction, bilateral- stable by duplex, see above. Recheck in 3 to 5 years. Coronary artery disease involving native coronary artery of native heart without angina pectoris- stable without angina, CHF, or palpitations. continue meds      HTN (hypertension), malignant-BP excellent now and mildly low but asymptomatic. Symptomatic iatrogenic hypotension recently, see above. She will stay hydrated, continue current meds, and mail me a weeklong blood pressure log. Try to keep systolics 463D to 786G if possible. Gastroesophageal reflux disease without esophagitis- stable, controlled, continue meds      Diabetes mellitus type 2, controlled ([address])- stable, controlled, continue meds      Chronic kidney disease, stage III (moderate)- stable, controlled, continue meds      Dyslipidemia- stable, controlled, continue atorvastatin as currently taking- per Dr. Salinas Joya- see above      S/P coronary artery stent placement- stable, controlled, continue meds                  Return in about 6 months (around 2/29/2024).          Bert Rogers MD  8/30/2023  9:40 AM

## 2023-08-30 ENCOUNTER — OFFICE VISIT (OUTPATIENT)
Age: 83
End: 2023-08-30
Payer: MEDICARE

## 2023-08-30 VITALS
BODY MASS INDEX: 29.64 KG/M2 | SYSTOLIC BLOOD PRESSURE: 110 MMHG | WEIGHT: 147 LBS | HEIGHT: 59 IN | HEART RATE: 76 BPM | DIASTOLIC BLOOD PRESSURE: 60 MMHG

## 2023-08-30 DIAGNOSIS — I65.23 BILATERAL CAROTID ARTERY STENOSIS WITHOUT CEREBRAL INFARCTION: ICD-10-CM

## 2023-08-30 DIAGNOSIS — Z95.5 S/P CORONARY ARTERY STENT PLACEMENT: ICD-10-CM

## 2023-08-30 DIAGNOSIS — I25.10 CORONARY ARTERY DISEASE INVOLVING NATIVE CORONARY ARTERY OF NATIVE HEART WITHOUT ANGINA PECTORIS: ICD-10-CM

## 2023-08-30 DIAGNOSIS — I50.22 CHRONIC SYSTOLIC HEART FAILURE (HCC): Primary | ICD-10-CM

## 2023-08-30 DIAGNOSIS — E78.5 DYSLIPIDEMIA: ICD-10-CM

## 2023-08-30 DIAGNOSIS — I48.0 PAF (PAROXYSMAL ATRIAL FIBRILLATION) (HCC): ICD-10-CM

## 2023-08-30 PROCEDURE — 1123F ACP DISCUSS/DSCN MKR DOCD: CPT | Performed by: INTERNAL MEDICINE

## 2023-08-30 PROCEDURE — 1036F TOBACCO NON-USER: CPT | Performed by: INTERNAL MEDICINE

## 2023-08-30 PROCEDURE — G8427 DOCREV CUR MEDS BY ELIG CLIN: HCPCS | Performed by: INTERNAL MEDICINE

## 2023-08-30 PROCEDURE — 3078F DIAST BP <80 MM HG: CPT | Performed by: INTERNAL MEDICINE

## 2023-08-30 PROCEDURE — 1090F PRES/ABSN URINE INCON ASSESS: CPT | Performed by: INTERNAL MEDICINE

## 2023-08-30 PROCEDURE — 3074F SYST BP LT 130 MM HG: CPT | Performed by: INTERNAL MEDICINE

## 2023-08-30 PROCEDURE — 99214 OFFICE O/P EST MOD 30 MIN: CPT | Performed by: INTERNAL MEDICINE

## 2023-08-30 PROCEDURE — G8400 PT W/DXA NO RESULTS DOC: HCPCS | Performed by: INTERNAL MEDICINE

## 2023-08-30 PROCEDURE — G8417 CALC BMI ABV UP PARAM F/U: HCPCS | Performed by: INTERNAL MEDICINE

## 2023-08-30 PROCEDURE — 93000 ELECTROCARDIOGRAM COMPLETE: CPT | Performed by: INTERNAL MEDICINE

## 2023-09-26 ENCOUNTER — TELEPHONE (OUTPATIENT)
Age: 83
End: 2023-09-26

## 2023-09-26 NOTE — TELEPHONE ENCOUNTER
----- Message from Bibi Khan sent at 9/26/2023 11:36 AM EDT -----    ----- Message -----  From: Winnie Mo MD  Sent: 9/26/2023   7:47 AM EDT  To: Lokesh Epps MA    BP log reviewed. Blood pressure labile but overall blood pressure and heart rate excellent. Continue current meds, stay hydrated, minimize sodium in diet and try to get some exercise. Keep routine follow-up.

## 2024-01-28 NOTE — PROGRESS NOTES
sores, nosebleeds, sore throat and tinnitus.    Eyes:  Negative for photophobia and visual disturbance.   Respiratory:  Negative for cough and shortness of breath.    Cardiovascular:  Negative for chest pain, palpitations and leg swelling.   Gastrointestinal:  Negative for abdominal distention, abdominal pain, constipation and diarrhea.   Endocrine: Negative for cold intolerance, heat intolerance, polydipsia and polyuria.   Genitourinary:  Negative for dysuria and hematuria.   Musculoskeletal:  Negative for arthralgias, joint swelling and myalgias.   Skin:  Negative for rash.   Allergic/Immunologic: Negative for environmental allergies and food allergies.   Neurological:  Negative for dizziness, seizures, syncope and light-headedness.   Hematological:  Negative for adenopathy. Does not bruise/bleed easily.   Psychiatric/Behavioral:  Negative for agitation, behavioral problems, dysphoric mood and hallucinations. The patient is not nervous/anxious.         PHYSICAL EXAM:     Vitals:    02/01/24 0850   BP: 104/66   Pulse: 80   Weight: 66.2 kg (146 lb)   Height: 1.499 m (4' 11\")      Wt Readings from Last 3 Encounters:   02/01/24 66.2 kg (146 lb)   08/30/23 66.7 kg (147 lb)   08/22/23 67.6 kg (149 lb)      BP Readings from Last 3 Encounters:   02/01/24 104/66   08/30/23 110/60   08/22/23 (!) 132/58        Physical Exam  Constitutional:       Appearance: Normal appearance. She is normal weight.   HENT:      Head: Normocephalic and atraumatic.      Nose: Nose normal.      Mouth/Throat:      Mouth: Mucous membranes are moist.      Pharynx: Oropharynx is clear.   Eyes:      Extraocular Movements: Extraocular movements intact.      Pupils: Pupils are equal, round, and reactive to light.   Neck:      Vascular: No carotid bruit or JVD.   Cardiovascular:      Rate and Rhythm: Normal rate and regular rhythm.      Heart sounds: No murmur heard.     No friction rub. No gallop.   Pulmonary:      Effort: Pulmonary effort is normal.

## 2024-02-01 ENCOUNTER — OFFICE VISIT (OUTPATIENT)
Age: 84
End: 2024-02-01

## 2024-02-01 VITALS
SYSTOLIC BLOOD PRESSURE: 104 MMHG | BODY MASS INDEX: 29.43 KG/M2 | HEIGHT: 59 IN | DIASTOLIC BLOOD PRESSURE: 66 MMHG | HEART RATE: 80 BPM | WEIGHT: 146 LBS

## 2024-02-01 DIAGNOSIS — I10 HTN (HYPERTENSION), MALIGNANT: ICD-10-CM

## 2024-02-01 DIAGNOSIS — I48.0 PAF (PAROXYSMAL ATRIAL FIBRILLATION) (HCC): Primary | ICD-10-CM

## 2024-02-01 DIAGNOSIS — I25.10 ATHEROSCLEROSIS OF NATIVE CORONARY ARTERY OF NATIVE HEART WITHOUT ANGINA PECTORIS: ICD-10-CM

## 2024-02-01 DIAGNOSIS — I25.10 CORONARY ARTERY DISEASE INVOLVING NATIVE CORONARY ARTERY OF NATIVE HEART WITHOUT ANGINA PECTORIS: ICD-10-CM

## 2024-02-01 DIAGNOSIS — I65.23 BILATERAL CAROTID ARTERY STENOSIS WITHOUT CEREBRAL INFARCTION: ICD-10-CM

## 2024-02-01 DIAGNOSIS — I50.22 CHRONIC SYSTOLIC HEART FAILURE (HCC): ICD-10-CM

## 2024-02-01 RX ORDER — M-VIT,TX,IRON,MINS/CALC/FOLIC 27MG-0.4MG
1 TABLET ORAL DAILY
COMMUNITY

## 2024-02-01 RX ORDER — AMLODIPINE BESYLATE 2.5 MG/1
2.5 TABLET ORAL DAILY
Qty: 90 TABLET | Refills: 3 | Status: SHIPPED | OUTPATIENT
Start: 2024-02-01

## 2024-02-01 RX ORDER — MULTIVIT WITH MINERALS/LUTEIN
1000 TABLET ORAL DAILY
COMMUNITY

## 2024-02-01 RX ORDER — NITROGLYCERIN 0.4 MG/1
0.4 TABLET SUBLINGUAL EVERY 5 MIN PRN
Qty: 25 TABLET | Refills: 0 | Status: CANCELLED | OUTPATIENT
Start: 2024-02-01

## 2024-02-01 RX ORDER — ATORVASTATIN CALCIUM 80 MG/1
80 TABLET, FILM COATED ORAL DAILY
COMMUNITY

## 2024-04-25 ENCOUNTER — TELEPHONE (OUTPATIENT)
Age: 84
End: 2024-04-25

## 2024-04-25 NOTE — TELEPHONE ENCOUNTER
----- Message from Chau Viera MD sent at 4/25/2024  8:35 AM EDT -----  Labs reviewed.  Lipid profile looks excellent.  Kidneys are normal.  Liver looks great as well.  Continue to work on diet, sugar will improve with a bit of weight loss over the next 6 to 12 months.  Keep routine follow-up.

## 2024-08-03 NOTE — PROGRESS NOTES
[facility] CARDIOLOGY  [address] [place] [address], [address]  [place] [address]  PHONE: [phone]        NAME:  Puja Barros  : 1940  MRN: [id number]     PCP:  Jerome Sanchez MD      SUBJECTIVE:   Puja Barros is a 84 y.o. female seen for a follow up visit regarding the following:     Chief Complaint   Patient presents with    Irregular Heart Beat       HPI:    Doing well since last visit without interval angina, CHF, palpitations, edema, presyncope or syncope but presented last year with worsening weakness, fatigue, and near syncope with low BP and low HR.  We stopped coreg and decreased amio and losartan and felt better immediately.  BP and HR higher and all symptoms noted above completely resolved.  BP remains low today in the office, holding 2.5 mg amlodipine today, with daily BP and heart rate log.  Echo last 2021 with low normal EF, mild AV sclerosis and MR, otherwise  benign.  Doing well s/p right CEA, surveillance duplex last year with mild bilateral ICA disease at most.  Asymptomatic from carotid standpoint.  Recent labs look good (see scanned sheets).      Carotid duplex :    Mild (<50%) stenosis in the right internal carotid artery.  Homogeneous plaque (proximal) in the right internal carotid artery.( s/p Right carotid endarterectomy)    Mild (<50%) instent restenosis in the left internal carotid artery.  Homogeneous plaque (proximal) in the left internal carotid artery.    Normal antegrade flow involving the right vertebral artery.    Normal antegrade flow involving the left vertebral artery.     Outpatient labs by Dr. Sanchez for 24 reviewed by me:  Cholesterol 168  Triglyceride 89  HDL 59  LDL 91  Creatinine 0.92  AST 25  ALT 22  Hemoglobin A1c 8.3    BP low today in the office but completely asymptomatic.  She states that her blood pressure is labile at home and ranging from 105 to 150 mmHg systolic at home with no postural symptoms whatsoever.

## 2024-08-06 ENCOUNTER — OFFICE VISIT (OUTPATIENT)
Age: 84
End: 2024-08-06
Payer: MEDICARE

## 2024-08-06 VITALS
WEIGHT: 152 LBS | SYSTOLIC BLOOD PRESSURE: 106 MMHG | HEART RATE: 64 BPM | DIASTOLIC BLOOD PRESSURE: 54 MMHG | BODY MASS INDEX: 30.64 KG/M2 | HEIGHT: 59 IN

## 2024-08-06 DIAGNOSIS — I65.23 BILATERAL CAROTID ARTERY STENOSIS WITHOUT CEREBRAL INFARCTION: ICD-10-CM

## 2024-08-06 DIAGNOSIS — I48.0 PAF (PAROXYSMAL ATRIAL FIBRILLATION) (HCC): Primary | ICD-10-CM

## 2024-08-06 DIAGNOSIS — R00.1 BRADYCARDIA: ICD-10-CM

## 2024-08-06 DIAGNOSIS — I25.10 ATHEROSCLEROSIS OF NATIVE CORONARY ARTERY OF NATIVE HEART WITHOUT ANGINA PECTORIS: ICD-10-CM

## 2024-08-06 DIAGNOSIS — I50.22 CHRONIC SYSTOLIC HEART FAILURE (HCC): ICD-10-CM

## 2024-08-06 PROCEDURE — G8400 PT W/DXA NO RESULTS DOC: HCPCS | Performed by: INTERNAL MEDICINE

## 2024-08-06 PROCEDURE — 3074F SYST BP LT 130 MM HG: CPT | Performed by: INTERNAL MEDICINE

## 2024-08-06 PROCEDURE — 93000 ELECTROCARDIOGRAM COMPLETE: CPT | Performed by: INTERNAL MEDICINE

## 2024-08-06 PROCEDURE — G8427 DOCREV CUR MEDS BY ELIG CLIN: HCPCS | Performed by: INTERNAL MEDICINE

## 2024-08-06 PROCEDURE — 1123F ACP DISCUSS/DSCN MKR DOCD: CPT | Performed by: INTERNAL MEDICINE

## 2024-08-06 PROCEDURE — 1090F PRES/ABSN URINE INCON ASSESS: CPT | Performed by: INTERNAL MEDICINE

## 2024-08-06 PROCEDURE — 3078F DIAST BP <80 MM HG: CPT | Performed by: INTERNAL MEDICINE

## 2024-08-06 PROCEDURE — G8417 CALC BMI ABV UP PARAM F/U: HCPCS | Performed by: INTERNAL MEDICINE

## 2024-08-06 PROCEDURE — 1036F TOBACCO NON-USER: CPT | Performed by: INTERNAL MEDICINE

## 2024-08-06 PROCEDURE — 99214 OFFICE O/P EST MOD 30 MIN: CPT | Performed by: INTERNAL MEDICINE

## 2025-02-08 NOTE — PROGRESS NOTES
[facility] CARDIOLOGY  [address] [place] [address], [address]  [place] [address]  PHONE: [phone]        NAME:  Puja Barros  : 1940  MRN: [id number]     PCP:  Jerome Sanchez MD      SUBJECTIVE:   Puja Barros is a 84 y.o. female seen for a follow up visit regarding the following:     Chief Complaint   Patient presents with    PAF (paroxysmal atrial fibrillation)       HPI:    Doing well since last visit without interval angina, CHF, palpitations, edema, presyncope or syncope but presented last year with worsening weakness, fatigue, and near syncope with low BP and low HR.  We stopped coreg and decreased amio and losartan and felt better immediately.  BP and HR higher and all symptoms noted above completely resolved.  BP remains low today in the office, holding 2.5 mg amlodipine today, with daily BP and heart rate log.  Echo last 2021 with low normal EF, mild AV sclerosis and MR, otherwise  benign.  Doing well s/p right CEA, surveillance duplex last year with mild bilateral ICA disease at most.  Asymptomatic from carotid standpoint.  Recent labs look good (see scanned sheets).      Carotid duplex :    Mild (<50%) stenosis in the right internal carotid artery.  Homogeneous plaque (proximal) in the right internal carotid artery.( s/p Right carotid endarterectomy)    Mild (<50%) instent restenosis in the left internal carotid artery.  Homogeneous plaque (proximal) in the left internal carotid artery.    Normal antegrade flow involving the right vertebral artery.    Normal antegrade flow involving the left vertebral artery.     Outpatient labs by Dr. Sanchez for 24 reviewed by me:  Cholesterol 168  Triglyceride 89  HDL 59  LDL 91  Creatinine 0.92  AST 25  ALT 22  Hemoglobin A1c 8.3    BP excellent today with no interval palpitations, tachycardia, presyncope or syncope.  Clinically euvolemic and exam benign today.  Using a walker everywhere she goes.  Balance and

## 2025-02-10 ENCOUNTER — OFFICE VISIT (OUTPATIENT)
Age: 85
End: 2025-02-10
Payer: MEDICARE

## 2025-02-10 VITALS
HEART RATE: 63 BPM | BODY MASS INDEX: 30.24 KG/M2 | HEIGHT: 59 IN | WEIGHT: 150 LBS | SYSTOLIC BLOOD PRESSURE: 122 MMHG | DIASTOLIC BLOOD PRESSURE: 68 MMHG

## 2025-02-10 DIAGNOSIS — Z95.5 S/P CORONARY ARTERY STENT PLACEMENT: ICD-10-CM

## 2025-02-10 DIAGNOSIS — R00.1 BRADYCARDIA: ICD-10-CM

## 2025-02-10 DIAGNOSIS — I65.23 BILATERAL CAROTID ARTERY STENOSIS WITHOUT CEREBRAL INFARCTION: ICD-10-CM

## 2025-02-10 DIAGNOSIS — E78.5 DYSLIPIDEMIA: ICD-10-CM

## 2025-02-10 DIAGNOSIS — I48.0 PAF (PAROXYSMAL ATRIAL FIBRILLATION) (HCC): ICD-10-CM

## 2025-02-10 DIAGNOSIS — I50.22 CHRONIC SYSTOLIC HEART FAILURE (HCC): Primary | ICD-10-CM

## 2025-02-10 PROCEDURE — 1126F AMNT PAIN NOTED NONE PRSNT: CPT | Performed by: INTERNAL MEDICINE

## 2025-02-10 PROCEDURE — 1159F MED LIST DOCD IN RCRD: CPT | Performed by: INTERNAL MEDICINE

## 2025-02-10 PROCEDURE — G8427 DOCREV CUR MEDS BY ELIG CLIN: HCPCS | Performed by: INTERNAL MEDICINE

## 2025-02-10 PROCEDURE — 1090F PRES/ABSN URINE INCON ASSESS: CPT | Performed by: INTERNAL MEDICINE

## 2025-02-10 PROCEDURE — 3074F SYST BP LT 130 MM HG: CPT | Performed by: INTERNAL MEDICINE

## 2025-02-10 PROCEDURE — 99214 OFFICE O/P EST MOD 30 MIN: CPT | Performed by: INTERNAL MEDICINE

## 2025-02-10 PROCEDURE — 93000 ELECTROCARDIOGRAM COMPLETE: CPT | Performed by: INTERNAL MEDICINE

## 2025-02-10 PROCEDURE — 1123F ACP DISCUSS/DSCN MKR DOCD: CPT | Performed by: INTERNAL MEDICINE

## 2025-02-10 PROCEDURE — G8400 PT W/DXA NO RESULTS DOC: HCPCS | Performed by: INTERNAL MEDICINE

## 2025-02-10 PROCEDURE — 3078F DIAST BP <80 MM HG: CPT | Performed by: INTERNAL MEDICINE

## 2025-02-10 PROCEDURE — G8417 CALC BMI ABV UP PARAM F/U: HCPCS | Performed by: INTERNAL MEDICINE

## 2025-02-10 PROCEDURE — 1036F TOBACCO NON-USER: CPT | Performed by: INTERNAL MEDICINE

## 2025-03-06 ENCOUNTER — TELEPHONE (OUTPATIENT)
Age: 85
End: 2025-03-06

## 2025-03-06 NOTE — TELEPHONE ENCOUNTER
MEDICATION REFILL REQUEST      Name of Medication:  eliquis  Dose:  2.5 mg  Frequency:  bid  Quantity:  60  Days' supply:  30 days       Pharmacy Name/Location:  [facility] [place]

## 2025-03-06 NOTE — TELEPHONE ENCOUNTER
Spoke with [facility] and they are going to get a fill ready for the pt.

## 2025-03-08 ENCOUNTER — RESULTS FOLLOW-UP (OUTPATIENT)
Age: 85
End: 2025-03-08

## 2025-03-10 NOTE — TELEPHONE ENCOUNTER
----- Message from Dr. Chau Viera MD sent at 3/8/2025  9:33 PM EST -----  Echo looks good. No major abnormalities. Continue current meds and keep follow up visit.

## 2025-03-25 ENCOUNTER — TELEPHONE (OUTPATIENT)
Age: 85
End: 2025-03-25

## 2025-03-25 NOTE — TELEPHONE ENCOUNTER
----- Message from Dr. Chau Viera MD sent at 3/25/2025  8:14 AM EDT -----  Labs reviewed.  Cholesterol and liver look good.  Triglycerides look good.  Other lab work fairly good but sugar is a bit high.  Needs to work on eating healthy low sugar/low-carb diet and keep routine follow-up.

## 2025-08-15 ENCOUNTER — OFFICE VISIT (OUTPATIENT)
Age: 85
End: 2025-08-15
Payer: MEDICARE

## 2025-08-15 VITALS
SYSTOLIC BLOOD PRESSURE: 99 MMHG | HEIGHT: 59 IN | WEIGHT: 147 LBS | HEART RATE: 65 BPM | DIASTOLIC BLOOD PRESSURE: 60 MMHG | BODY MASS INDEX: 29.64 KG/M2

## 2025-08-15 DIAGNOSIS — Z95.5 S/P CORONARY ARTERY STENT PLACEMENT: ICD-10-CM

## 2025-08-15 DIAGNOSIS — E78.5 DYSLIPIDEMIA: ICD-10-CM

## 2025-08-15 DIAGNOSIS — I50.22 CHRONIC SYSTOLIC HEART FAILURE (HCC): ICD-10-CM

## 2025-08-15 DIAGNOSIS — I65.23 BILATERAL CAROTID ARTERY STENOSIS WITHOUT CEREBRAL INFARCTION: ICD-10-CM

## 2025-08-15 DIAGNOSIS — I10 HTN (HYPERTENSION), MALIGNANT: ICD-10-CM

## 2025-08-15 DIAGNOSIS — I48.0 PAF (PAROXYSMAL ATRIAL FIBRILLATION) (HCC): Primary | ICD-10-CM

## 2025-08-15 PROCEDURE — 1159F MED LIST DOCD IN RCRD: CPT | Performed by: INTERNAL MEDICINE

## 2025-08-15 PROCEDURE — 99214 OFFICE O/P EST MOD 30 MIN: CPT | Performed by: INTERNAL MEDICINE

## 2025-08-15 PROCEDURE — G8427 DOCREV CUR MEDS BY ELIG CLIN: HCPCS | Performed by: INTERNAL MEDICINE

## 2025-08-15 PROCEDURE — 93000 ELECTROCARDIOGRAM COMPLETE: CPT | Performed by: INTERNAL MEDICINE

## 2025-08-15 PROCEDURE — 1123F ACP DISCUSS/DSCN MKR DOCD: CPT | Performed by: INTERNAL MEDICINE

## 2025-08-15 PROCEDURE — 1090F PRES/ABSN URINE INCON ASSESS: CPT | Performed by: INTERNAL MEDICINE

## 2025-08-15 PROCEDURE — 1036F TOBACCO NON-USER: CPT | Performed by: INTERNAL MEDICINE

## 2025-08-15 PROCEDURE — 1126F AMNT PAIN NOTED NONE PRSNT: CPT | Performed by: INTERNAL MEDICINE

## 2025-08-15 PROCEDURE — G8417 CALC BMI ABV UP PARAM F/U: HCPCS | Performed by: INTERNAL MEDICINE

## 2025-08-15 RX ORDER — LOSARTAN POTASSIUM 50 MG/1
50 TABLET ORAL DAILY
Qty: 90 TABLET | Refills: 3
Start: 2025-08-15

## 2025-08-15 RX ORDER — DOCUSATE SODIUM 100 MG/1
100 CAPSULE, LIQUID FILLED ORAL 2 TIMES DAILY
COMMUNITY

## (undated) DEVICE — 3M™ IOBAN™ 2 ANTIMICROBIAL INCISE DRAPE 6650EZ: Brand: IOBAN™ 2

## (undated) DEVICE — MAGNETIC DRAPE: Brand: DEVON

## (undated) DEVICE — Device

## (undated) DEVICE — DISH PTRI STRL --

## (undated) DEVICE — SUT PROL 6-0 30IN C1 DA BLU --

## (undated) DEVICE — INTENDED TO BE USED TO OCCLUDE, RETRACT AND IDENTIFY ARTERIES, VEINS, TENDONS AND NERVES IN SURGICAL PROCEDURES: Brand: STERION®  VESSEL LOOP

## (undated) DEVICE — COVER,MAYO STAND,STERILE: Brand: MEDLINE

## (undated) DEVICE — GOWN,REINF,POLY,ECL,PP SLV,XL: Brand: MEDLINE

## (undated) DEVICE — DERMABOND SKIN ADH 0.7ML -- DERMABOND ADVANCED 12/BX

## (undated) DEVICE — SUTURE VCRL SZ 3-0 L27IN ABSRB UD L26MM SH 1/2 CIR J416H

## (undated) DEVICE — KENDALL SCD EXPRESS SLEEVES, KNEE LENGTH, MEDIUM: Brand: KENDALL SCD

## (undated) DEVICE — GOWN,REINFORCED,POLY,AURORA,XXLARGE,STR: Brand: MEDLINE

## (undated) DEVICE — GOWN,PREVENTION PLUS,2XL,ST,22/CS: Brand: MEDLINE

## (undated) DEVICE — APPLIER CLP SM BLK TI AUTO HNDL DISP W/ 20 CLP PREM SURGICLP

## (undated) DEVICE — SOLUTION IV 1000ML 0.9% SOD CHL

## (undated) DEVICE — TAPE UMB 1/8X30IN MP COT WHT --

## (undated) DEVICE — 2000CC GUARDIAN II: Brand: GUARDIAN

## (undated) DEVICE — (D)PREP SKN CHLRAPRP APPL 26ML -- CONVERT TO ITEM 371833

## (undated) DEVICE — BASIC SINGLE BASIN-LF: Brand: MEDLINE INDUSTRIES, INC.

## (undated) DEVICE — SUTURE PERMAHAND SZ 3-0 L18IN NONABSORBABLE BLK SILK BRAID A184H

## (undated) DEVICE — STANDARD HYPODERMIC NEEDLE,POLYPROPYLENE HUB: Brand: MONOJECT

## (undated) DEVICE — PVC URETHRAL CATHETER: Brand: DOVER

## (undated) DEVICE — SUTURE VCRL SZ 2-0 L36IN ABSRB UD L36MM CT-1 1/2 CIR J945H

## (undated) DEVICE — DRAPE TWL SURG 16X26IN BLU ORB04] ALLCARE INC]

## (undated) DEVICE — SUTURE NONABSORBABLE MONOFILAMENT 6-0 BV-1 1X30 IN PROLENE 8709H

## (undated) DEVICE — SUTURE PROL SZ 6-0 L24IN NONABSORBABLE BLU BV-1 L9.3MM 3/8 M8805

## (undated) DEVICE — BLADE ASSEMB CLP HAIR FINE --

## (undated) DEVICE — GEL US 20GM NONIRRITATING OVERWRAPPED FILE PCH TRNSMIT

## (undated) DEVICE — SUTURE PERMAHAND SZ 2-0 L12X18IN NONABSORBABLE BLK SILK A185H

## (undated) DEVICE — REM POLYHESIVE ADULT PATIENT RETURN ELECTRODE: Brand: VALLEYLAB

## (undated) DEVICE — UNIVERSAL DRAPES: Brand: MEDLINE INDUSTRIES, INC.

## (undated) DEVICE — SUTURE NONABSORBABLE MONOFILAMENT 5-0 C-1 1X24 IN PROLENE 8725H

## (undated) DEVICE — CARDINAL HEALTH FLEXIBLE LIGHT HANDLE COVER: Brand: CARDINAL HEALTH

## (undated) DEVICE — CAROTID ARTERY SHUNT KIT,RADIOPAQUE LINE, STRAIGHT: Brand: ARGYLE

## (undated) DEVICE — (D)SYR 10ML 1/5ML GRAD NSAF -- PKGING CHANGE USE ITEM 338027

## (undated) DEVICE — SUTURE MCRYL SZ 4-0 L27IN ABSRB UD L19MM PS-2 1/2 CIR PRIM Y426H